# Patient Record
Sex: FEMALE | Race: WHITE | Employment: FULL TIME | URBAN - METROPOLITAN AREA
[De-identification: names, ages, dates, MRNs, and addresses within clinical notes are randomized per-mention and may not be internally consistent; named-entity substitution may affect disease eponyms.]

---

## 2020-10-09 ENCOUNTER — TELEPHONE (OUTPATIENT)
Dept: FAMILY MEDICINE CLINIC | Age: 35
End: 2020-10-09

## 2020-10-09 ENCOUNTER — OFFICE VISIT (OUTPATIENT)
Dept: FAMILY MEDICINE CLINIC | Age: 35
End: 2020-10-09
Payer: COMMERCIAL

## 2020-10-09 VITALS
BODY MASS INDEX: 32.04 KG/M2 | HEART RATE: 147 BPM | WEIGHT: 211.4 LBS | SYSTOLIC BLOOD PRESSURE: 112 MMHG | HEIGHT: 68 IN | DIASTOLIC BLOOD PRESSURE: 62 MMHG | TEMPERATURE: 97.5 F | OXYGEN SATURATION: 99 % | RESPIRATION RATE: 16 BRPM

## 2020-10-09 PROBLEM — E66.811 CLASS 1 OBESITY DUE TO EXCESS CALORIES WITHOUT SERIOUS COMORBIDITY WITH BODY MASS INDEX (BMI) OF 32.0 TO 32.9 IN ADULT: Status: ACTIVE | Noted: 2020-10-09

## 2020-10-09 PROBLEM — E66.09 CLASS 1 OBESITY DUE TO EXCESS CALORIES WITHOUT SERIOUS COMORBIDITY WITH BODY MASS INDEX (BMI) OF 32.0 TO 32.9 IN ADULT: Status: ACTIVE | Noted: 2020-10-09

## 2020-10-09 PROCEDURE — 99203 OFFICE O/P NEW LOW 30 MIN: CPT | Performed by: NURSE PRACTITIONER

## 2020-10-09 RX ORDER — CETIRIZINE HYDROCHLORIDE 10 MG/1
10 TABLET ORAL DAILY
COMMUNITY

## 2020-10-09 SDOH — HEALTH STABILITY: MENTAL HEALTH: HOW OFTEN DO YOU HAVE A DRINK CONTAINING ALCOHOL?: NEVER

## 2020-10-09 ASSESSMENT — PATIENT HEALTH QUESTIONNAIRE - PHQ9
2. FEELING DOWN, DEPRESSED OR HOPELESS: 0
SUM OF ALL RESPONSES TO PHQ QUESTIONS 1-9: 0
1. LITTLE INTEREST OR PLEASURE IN DOING THINGS: 0
SUM OF ALL RESPONSES TO PHQ QUESTIONS 1-9: 0
2. FEELING DOWN, DEPRESSED OR HOPELESS: 0
SUM OF ALL RESPONSES TO PHQ9 QUESTIONS 1 & 2: 0
SUM OF ALL RESPONSES TO PHQ9 QUESTIONS 1 & 2: 0
1. LITTLE INTEREST OR PLEASURE IN DOING THINGS: 0

## 2020-10-09 ASSESSMENT — ENCOUNTER SYMPTOMS
DIARRHEA: 0
NAUSEA: 0
CHEST TIGHTNESS: 0
APNEA: 0
TROUBLE SWALLOWING: 0
CONSTIPATION: 0
BACK PAIN: 0

## 2020-10-09 NOTE — PROGRESS NOTES
10/9/2020    Mateusz Reddy (:  1985) is a 36 y.o. female, here for evaluation of the following medical concerns:    HPI  To establish care at this practice. Relocated to [facility] related to work with the [employer]. Has family here. Body most happy at 160. Gained weight with last pregnancy and breast feeding. Baby 2 year old, no longer breast feeding. Feels related to lack of motivation. Talked to mental health provider about this. [place] to be situational.    Review of Systems   Constitutional: Negative for chills, fever and unexpected weight change. HENT: Negative for trouble swallowing. Respiratory: Negative for apnea and chest tightness. Cardiovascular: Negative for chest pain and palpitations. Gastrointestinal: Negative for constipation, diarrhea and nausea. Genitourinary: Negative for difficulty urinating. Musculoskeletal: Negative for arthralgias, back pain and gait problem. Neurological: Negative for dizziness, seizures and headaches. Psychiatric/Behavioral: Negative. Prior to Visit Medications    Medication Sig Taking? Authorizing Provider   levonorgestrel (MIRENA, 52 MG,) IUD 52 mg 1 each by Intrauterine route once Yes Historical Provider, MD   cetirizine (ZYRTEC) 10 MG tablet Take 10 mg by mouth daily Yes Historical Provider, MD        Allergies   Allergen Reactions    Codeine Nausea And Vomiting    Gluten Meal Rash     Digestive problems and eczema       History reviewed. No pertinent past medical history. History reviewed. No pertinent surgical history.     Social History     Socioeconomic History    Marital status:      Spouse name: Not on file    Number of children: Not on file    Years of education: Not on file    Highest education level: Not on file   Occupational History    Not on file   Social Needs    Financial resource strain: Not on file    Food insecurity     Worry: Not on file     Inability: Not on file  Transportation needs     Medical: Not on file     Non-medical: Not on file   Tobacco Use    Smoking status: Never Smoker    Smokeless tobacco: Never Used   Substance and Sexual Activity    Alcohol use: Never     Frequency: Never    Drug use: Never    Sexual activity: Yes     Partners: Male     Birth control/protection: I.U.D. Lifestyle    Physical activity     Days per week: Not on file     Minutes per session: Not on file    Stress: Not on file   Relationships    Social connections     Talks on phone: Not on file     Gets together: Not on file     Attends Roman Catholic service: Not on file     Active member of club or organization: Not on file     Attends meetings of clubs or organizations: Not on file     Relationship status: Not on file    Intimate partner violence     Fear of current or ex partner: Not on file     Emotionally abused: Not on file     Physically abused: Not on file     Forced sexual activity: Not on file   Other Topics Concern    Not on file   Social History Narrative    Not on file        Family History   Problem Relation Age of Onset    No Known Problems Mother     No Known Problems Father     No Known Problems Sister     No Known Problems Brother     No Known Problems Sister        Vitals:    10/09/20 1010   BP: 112/62   Site: Right Upper Arm   Position: Sitting   Cuff Size: Medium Adult   Pulse: 147   Resp: 16   Temp: 97.5 °F (36.4 °C)   TempSrc: Temporal   SpO2: 99%   Weight: 211 lb 6.4 oz (95.9 kg)   Height: 5' 8\" (1.727 m)     Estimated body mass index is 32.14 kg/m² as calculated from the following:    Height as of this encounter: 5' 8\" (1.727 m). Weight as of this encounter: 211 lb 6.4 oz (95.9 kg). Physical Exam  Constitutional:       Appearance: Normal appearance. She is well-developed. She is obese. HENT:      Head: Normocephalic and atraumatic. Neck:      Musculoskeletal: Normal range of motion and neck supple. Thyroid: No thyromegaly.       Vascular: No carotid bruit. Cardiovascular:      Rate and Rhythm: Normal rate and regular rhythm. Pulses: Normal pulses. Heart sounds: Normal heart sounds. Pulmonary:      Effort: Pulmonary effort is normal.      Breath sounds: Normal breath sounds. Abdominal:      General: Bowel sounds are normal. There is no distension. Palpations: Abdomen is soft. Musculoskeletal: Normal range of motion. Skin:     General: Skin is warm. Neurological:      Mental Status: She is alert and oriented to person, place, and time. Psychiatric:         Behavior: Behavior normal.         ASSESSMENT/PLAN:    1. Discussed limitation of calorie intake to decrease weight. Discussed weight watchers, on line apps, exercise    2. Last pap was with [facility]. Delivered at home. 6 weeks post partum mirena place and pap completed. Reported to be normal.     3. Declined flu vaccine    4. Javed Deluca was seen today for new patient. Diagnoses and all orders for this visit:    Preventative health care  -     TSH without Reflex; Future  -     T4, Free; Future  -     Lipid Panel; Future  -     Comprehensive Metabolic Panel; Future  -     CBC Auto Differential; Future    Weight gain  -     TSH without Reflex;  Future  -     T4, Free; Future                An  electronic signature was used to authenticate this note.    --MAX VENTURA - CNP on 10/9/2020 at 10:35 AM

## 2020-10-09 NOTE — TELEPHONE ENCOUNTER
Left message to patient advising her that if she can give us a form for the physical for her insurance, we can get it filled out for her. Advised patient to call back if she had any other questions or concerns.

## 2020-10-23 DIAGNOSIS — Z00.00 PREVENTATIVE HEALTH CARE: ICD-10-CM

## 2020-10-23 DIAGNOSIS — R63.5 WEIGHT GAIN: ICD-10-CM

## 2020-10-23 LAB
A/G RATIO: 2.1 (ref 1.1–2.2)
ALBUMIN SERPL-MCNC: 4.4 G/DL (ref 3.4–5)
ALP BLD-CCNC: 57 U/L (ref 40–129)
ALT SERPL-CCNC: 10 U/L (ref 10–40)
ANION GAP SERPL CALCULATED.3IONS-SCNC: 9 MMOL/L (ref 3–16)
AST SERPL-CCNC: 16 U/L (ref 15–37)
BASOPHILS ABSOLUTE: 0 K/UL (ref 0–0.2)
BASOPHILS RELATIVE PERCENT: 0.7 %
BILIRUB SERPL-MCNC: 0.5 MG/DL (ref 0–1)
BUN BLDV-MCNC: 10 MG/DL (ref 7–20)
CALCIUM SERPL-MCNC: 8.9 MG/DL (ref 8.3–10.6)
CHLORIDE BLD-SCNC: 107 MMOL/L (ref 99–110)
CHOLESTEROL, TOTAL: 156 MG/DL (ref 0–199)
CO2: 24 MMOL/L (ref 21–32)
CREAT SERPL-MCNC: <0.5 MG/DL (ref 0.6–1.1)
EOSINOPHILS ABSOLUTE: 0.2 K/UL (ref 0–0.6)
EOSINOPHILS RELATIVE PERCENT: 2.8 %
GFR AFRICAN AMERICAN: >60
GFR NON-AFRICAN AMERICAN: >60
GLOBULIN: 2.1 G/DL
GLUCOSE BLD-MCNC: 83 MG/DL (ref 70–99)
HCT VFR BLD CALC: 35.7 % (ref 36–48)
HDLC SERPL-MCNC: 50 MG/DL (ref 40–60)
HEMOGLOBIN: 11.5 G/DL (ref 12–16)
LDL CHOLESTEROL CALCULATED: 97 MG/DL
LYMPHOCYTES ABSOLUTE: 1.8 K/UL (ref 1–5.1)
LYMPHOCYTES RELATIVE PERCENT: 30.3 %
MCH RBC QN AUTO: 27.2 PG (ref 26–34)
MCHC RBC AUTO-ENTMCNC: 32.2 G/DL (ref 31–36)
MCV RBC AUTO: 84.4 FL (ref 80–100)
MONOCYTES ABSOLUTE: 0.5 K/UL (ref 0–1.3)
MONOCYTES RELATIVE PERCENT: 8.2 %
NEUTROPHILS ABSOLUTE: 3.4 K/UL (ref 1.7–7.7)
NEUTROPHILS RELATIVE PERCENT: 58 %
PDW BLD-RTO: 15.3 % (ref 12.4–15.4)
PLATELET # BLD: 258 K/UL (ref 135–450)
PMV BLD AUTO: 8.8 FL (ref 5–10.5)
POTASSIUM SERPL-SCNC: 4.3 MMOL/L (ref 3.5–5.1)
RBC # BLD: 4.23 M/UL (ref 4–5.2)
SODIUM BLD-SCNC: 140 MMOL/L (ref 136–145)
T4 FREE: 1.1 NG/DL (ref 0.9–1.8)
TOTAL PROTEIN: 6.5 G/DL (ref 6.4–8.2)
TRIGL SERPL-MCNC: 47 MG/DL (ref 0–150)
TSH SERPL DL<=0.05 MIU/L-ACNC: 1.38 UIU/ML (ref 0.27–4.2)
VLDLC SERPL CALC-MCNC: 9 MG/DL
WBC # BLD: 5.9 K/UL (ref 4–11)

## 2021-04-23 ENCOUNTER — TELEPHONE (OUTPATIENT)
Dept: FAMILY MEDICINE CLINIC | Age: 36
End: 2021-04-23

## 2021-04-23 NOTE — TELEPHONE ENCOUNTER
Received records request from pt. Called and adv her that a medical records request will incur a charge. Pt said that is fine.

## 2022-12-12 ENCOUNTER — OFFICE VISIT (OUTPATIENT)
Dept: FAMILY MEDICINE CLINIC | Age: 37
End: 2022-12-12
Payer: COMMERCIAL

## 2022-12-12 VITALS
HEART RATE: 88 BPM | BODY MASS INDEX: 29.66 KG/M2 | HEIGHT: 70 IN | DIASTOLIC BLOOD PRESSURE: 78 MMHG | SYSTOLIC BLOOD PRESSURE: 122 MMHG | WEIGHT: 207.2 LBS | OXYGEN SATURATION: 97 %

## 2022-12-12 DIAGNOSIS — F43.10 PTSD (POST-TRAUMATIC STRESS DISORDER): ICD-10-CM

## 2022-12-12 DIAGNOSIS — K90.0 CELIAC DISEASE: ICD-10-CM

## 2022-12-12 DIAGNOSIS — M67.911 ROTATOR CUFF DISORDER, RIGHT: ICD-10-CM

## 2022-12-12 DIAGNOSIS — Z76.89 ENCOUNTER TO ESTABLISH CARE: Primary | ICD-10-CM

## 2022-12-12 DIAGNOSIS — F33.42 RECURRENT MAJOR DEPRESSIVE DISORDER, IN FULL REMISSION (HCC): ICD-10-CM

## 2022-12-12 PROBLEM — E66.811 CLASS 1 OBESITY DUE TO EXCESS CALORIES WITHOUT SERIOUS COMORBIDITY WITH BODY MASS INDEX (BMI) OF 32.0 TO 32.9 IN ADULT: Status: RESOLVED | Noted: 2020-10-09 | Resolved: 2022-12-12

## 2022-12-12 PROBLEM — E66.09 CLASS 1 OBESITY DUE TO EXCESS CALORIES WITHOUT SERIOUS COMORBIDITY WITH BODY MASS INDEX (BMI) OF 32.0 TO 32.9 IN ADULT: Status: RESOLVED | Noted: 2020-10-09 | Resolved: 2022-12-12

## 2022-12-12 PROCEDURE — 99395 PREV VISIT EST AGE 18-39: CPT | Performed by: STUDENT IN AN ORGANIZED HEALTH CARE EDUCATION/TRAINING PROGRAM

## 2022-12-12 PROCEDURE — 99214 OFFICE O/P EST MOD 30 MIN: CPT | Performed by: STUDENT IN AN ORGANIZED HEALTH CARE EDUCATION/TRAINING PROGRAM

## 2022-12-12 SDOH — ECONOMIC STABILITY: FOOD INSECURITY: WITHIN THE PAST 12 MONTHS, YOU WORRIED THAT YOUR FOOD WOULD RUN OUT BEFORE YOU GOT MONEY TO BUY MORE.: NEVER TRUE

## 2022-12-12 SDOH — ECONOMIC STABILITY: HOUSING INSECURITY
IN THE LAST 12 MONTHS, WAS THERE A TIME WHEN YOU DID NOT HAVE A STEADY PLACE TO SLEEP OR SLEPT IN A SHELTER (INCLUDING NOW)?: NO

## 2022-12-12 SDOH — ECONOMIC STABILITY: FOOD INSECURITY: WITHIN THE PAST 12 MONTHS, THE FOOD YOU BOUGHT JUST DIDN'T LAST AND YOU DIDN'T HAVE MONEY TO GET MORE.: NEVER TRUE

## 2022-12-12 SDOH — ECONOMIC STABILITY: TRANSPORTATION INSECURITY
IN THE PAST 12 MONTHS, HAS THE LACK OF TRANSPORTATION KEPT YOU FROM MEDICAL APPOINTMENTS OR FROM GETTING MEDICATIONS?: NO

## 2022-12-12 SDOH — ECONOMIC STABILITY: INCOME INSECURITY: IN THE LAST 12 MONTHS, WAS THERE A TIME WHEN YOU WERE NOT ABLE TO PAY THE MORTGAGE OR RENT ON TIME?: NO

## 2022-12-12 SDOH — ECONOMIC STABILITY: HOUSING INSECURITY: IN THE LAST 12 MONTHS, HOW MANY PLACES HAVE YOU LIVED?: 1

## 2022-12-12 SDOH — ECONOMIC STABILITY: TRANSPORTATION INSECURITY
IN THE PAST 12 MONTHS, HAS LACK OF TRANSPORTATION KEPT YOU FROM MEETINGS, WORK, OR FROM GETTING THINGS NEEDED FOR DAILY LIVING?: NO

## 2022-12-12 ASSESSMENT — ENCOUNTER SYMPTOMS
SHORTNESS OF BREATH: 0
CHEST TIGHTNESS: 0
BLOOD IN STOOL: 0
ABDOMINAL DISTENTION: 0
PHOTOPHOBIA: 0
WHEEZING: 0

## 2022-12-12 ASSESSMENT — PATIENT HEALTH QUESTIONNAIRE - PHQ9
1. LITTLE INTEREST OR PLEASURE IN DOING THINGS: 1
SUM OF ALL RESPONSES TO PHQ QUESTIONS 1-9: 2
SUM OF ALL RESPONSES TO PHQ9 QUESTIONS 1 & 2: 2
SUM OF ALL RESPONSES TO PHQ QUESTIONS 1-9: 2
2. FEELING DOWN, DEPRESSED OR HOPELESS: 1

## 2022-12-12 ASSESSMENT — SOCIAL DETERMINANTS OF HEALTH (SDOH): HOW HARD IS IT FOR YOU TO PAY FOR THE VERY BASICS LIKE FOOD, HOUSING, MEDICAL CARE, AND HEATING?: NOT HARD AT ALL

## 2022-12-12 NOTE — PROGRESS NOTES
Kieran Alvarez  YOB: 1985    Date of Service:  12/12/2022    Chief Complaint:   Mariella Schrader is a 40 y.o. female who presents to establish care      Allergies: Allergies   Allergen Reactions    Codeine Nausea And Vomiting    Gluten Meal Rash     Digestive problems and eczema       Family Hx:  Family History   Problem Relation Age of Onset    No Known Problems Mother     No Known Problems Father     No Known Problems Sister     No Known Problems Brother     No Known Problems Sister     Heart Attack Paternal Grandfather        Surgical HX:  No past surgical history on file. 1 hospital birth and 2 home births  37 stitches after shoulder dystocia with hospital birth      Social Hx:  Alcohol- none, dont care for it, partner is 10 years   Tobacco- never  Recreational- never  No LMP recorded. LMP - 2 weeks ago  menstrual cycle: cycles are irregular, further apart with IUD  Sexual activity: has sex with a male, no hx STd  AbnormalSxs: no      Medications:  Current Outpatient Medications   Medication Sig Dispense Refill    levonorgestrel (MIRENA, 52 MG,) IUD 52 mg 1 each by Intrauterine route once      cetirizine (ZYRTEC) 10 MG tablet Take 10 mg by mouth daily       No current facility-administered medications for this visit.          PMHx:  Patient Active Problem List   Diagnosis    Celiac disease    PTSD (post-traumatic stress disorder)    Recurrent major depressive disorder, in full remission ([facility] [address]    Rotator cuff disorder, right           HPI:    Hx of PTSD and depression - saw a therapist, from past relationship    - overall doing well  Still have to beny to therapist and some things still trigger, but doing much better since 2-3 years ago  No Si/HI    No passive SI          Pain in right shoulder for 4-5 months  See chiro regularly  Been treating as tenoditis, adjusting weight lifting routine and babying it  Last visit with mainor, thinks it might be tear in rotator cuff  Ibuprofen PRN - once or twice a week - helps it    No Physical therapy yet, no heat or ice  Certain moves will hurt worse than others, ushing vacuum leaner or popping door handle in or handing sippy cup to kid in back seat      Wt Readings from Last 3 Encounters:   12/12/22 207 lb 3.2 oz (94 kg)   10/09/20 211 lb 6.4 oz (95.9 kg)     BP Readings from Last 3 Encounters:   12/12/22 122/78   10/09/20 112/62       Health Maintenance   Topic Date Due    HIV screen  Never done    Hepatitis C screen  Never done    COVID-19 Vaccine (4 - Booster for Moderna series) 03/28/2022    Flu vaccine (1) 12/12/2023 (Originally 8/1/2022)    Cervical cancer screen  12/02/2024 (Originally 12/5/2007)    Depression Monitoring  12/12/2023    DTaP/Tdap/Td vaccine (2 - Td or Tdap) 04/09/2030    Hepatitis A vaccine  Aged Out    Hib vaccine  Aged Out    Meningococcal (ACWY) vaccine  Aged Out    Pneumococcal 0-64 years Vaccine  Aged Out    Varicella vaccine  Discontinued       Immunization History   Administered Date(s) Administered    COVID-19, MODERNA BLUE border, Primary or Immunocompromised, (age 12y+), IM, 100 mcg/0.5mL 04/16/2021, 05/17/2021    COVID-19, MODERNA Booster BLUE border, (age 18y+), IM, 50mcg/0.25mL 01/31/2022    Tdap (Boostrix, Adacel) 04/09/2020       No flowsheet data found. PHQ-9  12/12/2022   Little interest or pleasure in doing things 1   Feeling down, depressed, or hopeless 1   PHQ-2 Score 2   PHQ-9 Total Score 2        The ASCVD Risk score (Antonieta DK, et al., 2019) failed to calculate for the following reasons: The 2019 ASCVD risk score is only valid for ages 36 to 78        Review of Systems:  Review of Systems   Constitutional:  Negative for fever and unexpected weight change. HENT:  Negative for nosebleeds. Eyes:  Negative for photophobia. Respiratory:  Negative for chest tightness, shortness of breath and wheezing. Cardiovascular:  Negative for chest pain, palpitations and leg swelling.    Gastrointestinal:  Negative for abdominal distention and blood in stool. Genitourinary:  Negative for dysuria. Musculoskeletal:  Negative for gait problem. Neurological:  Negative for seizures and syncope. Psychiatric/Behavioral:  Negative for behavioral problems. Physical Exam:   Vitals:    12/12/22 1603   BP: 122/78   Site: Right Upper Arm   Position: Sitting   Cuff Size: Large Adult   Pulse: 88   SpO2: 97%   Weight: 207 lb 3.2 oz (94 kg)   Height: 5' 10\" (1.778 m)     Body mass index is 29.73 kg/m². Physical Exam  Constitutional:       Appearance: Normal appearance. HENT:      Head: Atraumatic. Right Ear: Tympanic membrane and external ear normal.      Left Ear: Tympanic membrane and external ear normal.      Nose: Nose normal.      Mouth/Throat:      Mouth: Mucous membranes are moist.      Pharynx: Oropharynx is clear. No posterior oropharyngeal erythema. Eyes:      General: No scleral icterus. Extraocular Movements: Extraocular movements intact. Pupils: Pupils are equal, round, and reactive to light. Cardiovascular:      Rate and Rhythm: Normal rate and regular rhythm. Pulses: Normal pulses. Heart sounds: No murmur heard. Pulmonary:      Effort: Pulmonary effort is normal.      Breath sounds: Normal breath sounds. Abdominal:      General: There is no distension. Palpations: Abdomen is soft. There is no mass. Tenderness: There is no abdominal tenderness. Musculoskeletal:         General: Normal range of motion. Cervical back: Normal range of motion and neck supple. Comments: Unable to internally rotate right hand off of back, weakness of empty can sign, full FF, abduction, pain with abduction past 100 degrees of right arm, positive thomas right shoulder    Normal left shoulder exam   Skin:     General: Skin is warm and dry. Capillary Refill: Capillary refill takes 2 to 3 seconds. Neurological:      General: No focal deficit present. Mental Status: She is alert. Psychiatric:         Mood and Affect: Mood normal.         Behavior: Behavior normal.       Assessment/Plan:    Patient presents today to establish care with new provider. Annual physical today looks good, all previous blood work was normal, did get CBC with history of mild anemia, patient does donate blood regularly. We will also get BMP today as well as HIV and hepatitis C screening. Patient declined flu vaccine today. Depression screen was fine. Patient is due for her COVID booster. Patient's main concern today is right shoulder pain, has been seeing a chiropractor, consistent with rotator cuff injury. Unable to internally rotate away from her back, positive empty can sign with weakness and positive Ramirez with pain. We will send to orthopedics. Patient is a history of depression and PTSD, she currently sees a therapist, well controlled without any medications, though Zoloft and prazosin were effective in the past.  We will have her continue to follow with her therapist.      1. Encounter to establish care  -     Basic Metabolic Panel; Future  -     CBC; Future  -     HIV Screen; Future  -     Hepatitis C Antibody; Future  2. Celiac disease  3. PTSD (post-traumatic stress disorder)  4. Recurrent major depressive disorder, in full remission ([facility] [address]  5. Rotator cuff disorder, right  -     Qian Arroyo MD, Orthopedic Surgery (Shoulder; Hip; Knee), [facility]     While assessing care for this patient, I have reviewed all pertinent lab work/imaging/ specialist notes and care in reference to those problems addressed above in detail. Appropriate medical decision making was based on this. Please note that portions of this note may have been completed with a voice recognition program. Efforts were made to edit the dictations but occasionally words are mis-transcribed. Return in about 6 months (around 6/12/2023) for PTSD and shoulder.     Jessica Good MD  12/12/2022

## 2022-12-12 NOTE — PATIENT INSTRUCTIONS
We are drawing some labs today. If you have MyChart, you will see the results first, but our office will reach out to you in the next few days to over the results.

## 2022-12-13 SDOH — HEALTH STABILITY: PHYSICAL HEALTH: ON AVERAGE, HOW MANY MINUTES DO YOU ENGAGE IN EXERCISE AT THIS LEVEL?: 30 MIN

## 2022-12-13 SDOH — HEALTH STABILITY: PHYSICAL HEALTH: ON AVERAGE, HOW MANY DAYS PER WEEK DO YOU ENGAGE IN MODERATE TO STRENUOUS EXERCISE (LIKE A BRISK WALK)?: 5 DAYS

## 2022-12-14 ENCOUNTER — OFFICE VISIT (OUTPATIENT)
Dept: ORTHOPEDIC SURGERY | Age: 37
End: 2022-12-14
Payer: COMMERCIAL

## 2022-12-14 ENCOUNTER — TELEPHONE (OUTPATIENT)
Dept: ORTHOPEDIC SURGERY | Age: 37
End: 2022-12-14

## 2022-12-14 VITALS — WEIGHT: 210 LBS | HEIGHT: 70 IN | BODY MASS INDEX: 30.06 KG/M2 | RESPIRATION RATE: 16 BRPM

## 2022-12-14 DIAGNOSIS — M25.511 RIGHT SHOULDER PAIN, UNSPECIFIED CHRONICITY: Primary | ICD-10-CM

## 2022-12-14 PROCEDURE — 99203 OFFICE O/P NEW LOW 30 MIN: CPT | Performed by: ORTHOPAEDIC SURGERY

## 2022-12-14 NOTE — PROGRESS NOTES
CHIEF COMPLAINT: Right shoulder pain    History:    Tashi Kimble is a 40 y.o. right handed female referred by Faheem Wade MD for Sports Medicine consultation for evaluation and treatment of Right shoulder pain. This is evaluated as a personal injury. The pain began 6 months ago. Pain is rated as a 6/10. There was not an injury. Pain is located laterally, posteriorly and along her trapezius. And feels are worse with laying on her side, reaching behind her back, and repetitive forward and backward motion while using her vacuum . She sees a chiropractor. The patient has not had PT. The patient has not had an injection. The patient has tried NSAIDs with relief. The patient has not tried ice. Patient's occupation is       Past Medical History:   Diagnosis Date    Anxiety 2020    Treated and released, neuropsych center [facility] 2020    Treated and released, neuropsych [facility]       No past surgical history on file. Current Outpatient Medications on File Prior to Visit   Medication Sig Dispense Refill    levonorgestrel (MIRENA, 52 MG,) IUD 52 mg 1 each by Intrauterine route once      cetirizine (ZYRTEC) 10 MG tablet Take 10 mg by mouth daily       No current facility-administered medications on file prior to visit.        Allergies   Allergen Reactions    Codeine Nausea And Vomiting    Gluten Meal Rash     Digestive problems and eczema       Social History     Socioeconomic History    Marital status:      Spouse name: Not on file    Number of children: Not on file    Years of education: Not on file    Highest education level: Not on file   Occupational History    Not on file   Tobacco Use    Smoking status: Never    Smokeless tobacco: Never   Vaping Use    Vaping Use: Never used   Substance and Sexual Activity    Alcohol use: Never    Drug use: Never    Sexual activity: Yes     Partners: Male     Birth control/protection: I.U.D. Other Topics Concern    Not on file   Social History Narrative    Not on file     Social Determinants of Health     Financial Resource Strain: Low Risk     Difficulty of Paying Living Expenses: Not hard at all   Food Insecurity: No Food Insecurity    Worried About [facility] in the Last Year: Never true    [facility] in the Last Year: Never true   Transportation Needs: No Transportation Needs    Lack of Transportation (Medical): No    Lack of Transportation (Non-Medical): No   Physical Activity: Sufficiently Active    Days of Exercise per Week: 5 days    Minutes of Exercise per Session: 30 min   Stress: Not on file   Social Connections: Not on file   Intimate Partner Violence: Not At Risk    Fear of Current or Ex-Partner: No    Emotionally Abused: No    Physically Abused: No    Sexually Abused: No   Housing Stability: Low Risk     Unable to Pay for Housing in the Last Year: No    Number of Places Lived in the Last Year: 1    Unstable Housing in the Last Year: No       Family History   Problem Relation Age of Onset    No Known Problems Mother     No Known Problems Father     No Known Problems Sister     No Known Problems Brother     No Known Problems Sister     Heart Attack Paternal Grandfather            Physical Examination:      Vital signs:  Resp 16   Ht 5' 10\" (1.778 m)   Wt 210 lb (95.3 kg)   LMP 11/28/2022   BMI 30.13 kg/m²     General:   alert, appears stated age, cooperative, and no distress   Right Shoulder   Active ROM:   forward flexion 180, external rotation 60, internal rotation T10. Left shoulder: forward flexion 180, external rotation 80, internal rotation T10.       Joint Tenderness:   lateral acromial   Neer:   negative   Ramirez:   negative   Strength:   5/5 Supraspinatus, 5-/5 External rotation    Left shoulder: 5/5 Supraspinatus, External rotation    Drop-arm test:   negative   Belly-press test:   negative   Bear-hug test:   negative   Speed's test:   Unable to test secondary to apin   Bicipital groove tenderness:  positive   Patterson's test:   not tested   Cross-body adduction test:   Pain laterally    AC joint tenderness:   negative     There are no skin lesions, cellulitis, or extreme edema in the upper extremities. Sensation is grossly intact to light touch bilaterally upper extremity. The patient has warm and well-perfused bilateral upper extremities with brisk capillary refill. Imaging   Right Shoulder X-Ray: 3 views obtained and reviewed  AC Joint: narrowing moderate  Glenohumeral joint: no abnormalities noted  Elevation humeral head: absent    Right Shoulder MRI: ordered, but not yet obtained      Assessment:      Right shoulder pain, rotator cuff tinnitus versus tear      Plan:      Natural history and expected course discussed. Questions answered. Discussed with patient I suspect her symptoms are coming from her rotator cuff. We discussed that most patients her age would not have a rotator cuff tear unless they have significant trauma. However, on exam she does have rotator cuff weakness on external rotation testing and no obvious pain during testing her strength. Discussed immediately suspicious for a rotator cuff tear. MRI of right shoulder to evaluate rotator cuff or tear. Continue NSAIDs as needed. Reccommend ice the shoulder as needed. Follow-up after MRI            Baron Sevilla. Juan Devries MD  Orthopaedic Surgery and Sports Medicine     Disclaimer: This note was generated with use of a verbal recognition program and an attempt was made to check for errors. It is possible that there are still dictated errors within this office note. If so, please bring any significant errors to my attention for an addendum. All efforts were made to ensure that this office note is accurate.

## 2022-12-14 NOTE — TELEPHONE ENCOUNTER
MRI order, authorization and demographics faxed to [facility]. Patient notified to call [facility] if she has not heard from them by this time tomorrow to schedule.  Follow up in office for results and treatment options

## 2023-01-11 ENCOUNTER — OFFICE VISIT (OUTPATIENT)
Dept: ORTHOPEDIC SURGERY | Age: 38
End: 2023-01-11
Payer: COMMERCIAL

## 2023-01-11 VITALS — RESPIRATION RATE: 16 BRPM | WEIGHT: 212 LBS | BODY MASS INDEX: 30.35 KG/M2 | HEIGHT: 70 IN

## 2023-01-11 DIAGNOSIS — M75.111 INCOMPLETE TEAR OF RIGHT ROTATOR CUFF, UNSPECIFIED WHETHER TRAUMATIC: Primary | ICD-10-CM

## 2023-01-11 PROCEDURE — 99213 OFFICE O/P EST LOW 20 MIN: CPT | Performed by: ORTHOPAEDIC SURGERY

## 2023-01-11 NOTE — PROGRESS NOTES
CHIEF COMPLAINT: Right shoulder pain    History:    Gustavo Young is a 40 y.o. right handed female here for right shoulder follow-up. Initial history: referred by Erik Fleming MD for Sports Medicine consultation for evaluation and treatment of Right shoulder pain. This is evaluated as a personal injury. The pain began 6 months ago. Pain is rated as a 6/10. There was not an injury. Pain is located laterally, posteriorly and along her trapezius. And feels are worse with laying on her side, reaching behind her back, and repetitive forward and backward motion while using her vacuum . She sees a chiropractor. The patient has not had PT. The patient has not had an injection. The patient has tried NSAIDs with relief. The patient has not tried ice. Patient's occupation is     Interval History: She states pain now is located mostly anteriorly. She rates pain 4/10. Past Medical History:   Diagnosis Date    Anxiety 2020    Treated and released, neuropsych [facility]    Depression 2020    Treated and released, neuropsych [facility]       History reviewed. No pertinent surgical history. Current Outpatient Medications on File Prior to Visit   Medication Sig Dispense Refill    Ferrous Sulfate (IRON PO) Take by mouth      Ascorbic Acid (VITAMIN C PO) Take by mouth      levonorgestrel (MIRENA, 52 MG,) IUD 52 mg 1 each by Intrauterine route once      cetirizine (ZYRTEC) 10 MG tablet Take 10 mg by mouth daily       No current facility-administered medications on file prior to visit.        Allergies   Allergen Reactions    Codeine Nausea And Vomiting    Gluten Meal Rash     Digestive problems and eczema       Social History     Socioeconomic History    Marital status:      Spouse name: Not on file    Number of children: Not on file    Years of education: Not on file    Highest education level: Not on file   Occupational History Occupation:    Tobacco Use    Smoking status: Never    Smokeless tobacco: Never   Vaping Use    Vaping Use: Never used   Substance and Sexual Activity    Alcohol use: Never    Drug use: Never    Sexual activity: Yes     Partners: Male     Birth control/protection: I.U.D. Other Topics Concern    Not on file   Social History Narrative    Not on file     Social Determinants of Health     Financial Resource Strain: Low Risk     Difficulty of Paying Living Expenses: Not hard at all   Food Insecurity: No Food Insecurity    Worried About [address] in the Last Year: Never true    [address] [facility] in the Last Year: Never true   Transportation Needs: No Transportation Needs    Lack of Transportation (Medical): No    Lack of Transportation (Non-Medical): No   Physical Activity: Sufficiently Active    Days of Exercise per Week: 5 days    Minutes of Exercise per Session: 30 min   Stress: Not on file   Social Connections: Not on file   Intimate Partner Violence: Not At Risk    Fear of Current or Ex-Partner: No    Emotionally Abused: No    Physically Abused: No    Sexually Abused: No   Housing Stability: Low Risk     Unable to Pay for Housing in the Last Year: No    Number of Places Lived in the Last Year: 1    Unstable Housing in the Last Year: No       Family History   Problem Relation Age of Onset    No Known Problems Mother     No Known Problems Father     No Known Problems Sister     No Known Problems Brother     No Known Problems Sister     Heart Attack Paternal Grandfather            Physical Examination:      Vital signs:  Resp 16   Ht 5' 10\" (1.778 m)   Wt 212 lb (96.2 kg)   BMI 30.42 kg/m²     General:   alert, appears stated age, cooperative, and no distress   Right Shoulder   Active ROM:   forward flexion 180, external rotation 60, internal rotation T10. Left shoulder: forward flexion 180, external rotation 80, internal rotation T10.       Joint Tenderness:   lateral acromial   Neer:   negative   Ramirez:   negative   Strength:   5/5 Supraspinatus, 5-/5 External rotation    Left shoulder: 5/5 Supraspinatus, External rotation    Drop-arm test:   negative   Belly-press test:   negative   Bear-hug test:   negative   Speed's test:  Positive   Bicipital groove tenderness:  positive   Patterson's test:  Positive   Cross-body adduction test:   Pain laterally    AC joint tenderness:   negative        Imaging   Right Shoulder X-Ray:   AC Joint: narrowing moderate  Glenohumeral joint: no abnormalities noted  Elevation humeral head: absent    Right Shoulder MRI:   1. Biceps pulley mechanism injury, with mild subscapularis tendinopathy and humeral sided    delamination of the superior insertional fibers. Adjacent mild tendinopathy of the long head    biceps tendon, with tendon partially subluxed at the bicipital groove. 2. Nondisplaced SLAP type 2B tear of the superior glenoid labrum. 3. Mild AC joint arthrosis, with penetrating erosions and localized osteoedema of the distal    clavicle. No acute AC joint injury or separation. 4. The remainder of the rotator cuff complex is intact. Assessment:      Right shoulder partial rotator cuff open (subscapularis) tear  Right shoulder long head biceps tendon subluxation  Right shoulder SLAP tear        Plan:      Natural history and expected course discussed. Questions answered. We reviewed the MRI images and discussed the findings. Discussed that I would normally recommend initial nonoperative treatment for SLAP tear. Some of her symptoms are consistent with that. Her biceps symptoms may also be secondary to her SLAP tear. However, we also discussed that she does have an articular sided partial tear of her subscapularis which likely result in the biceps tendon subluxation. There is also possibility that she may have a slight full thickness upper border tear resulting in the biceps tendon subluxation.   We discussed nonoperative and operative treatment options. She wants to start with physical therapy and think about either continuing nonoperatively versus surgical intervention. PT referral provided. Ice and NSAIDs as needed. Follow-up in 6 weeks. Nannette Allen. Lucien Herman MD  Orthopaedic Surgery and Sports Medicine     Disclaimer: This note was generated with use of a verbal recognition program and an attempt was made to check for errors. It is possible that there are still dictated errors within this office note. If so, please bring any significant errors to my attention for an addendum. All efforts were made to ensure that this office note is accurate.

## 2023-01-17 ENCOUNTER — HOSPITAL ENCOUNTER (OUTPATIENT)
Dept: PHYSICAL THERAPY | Age: 38
Setting detail: THERAPIES SERIES
Discharge: HOME | End: 2023-01-17
Payer: COMMERCIAL

## 2023-01-17 PROCEDURE — 97161 PT EVAL LOW COMPLEX 20 MIN: CPT

## 2023-01-17 PROCEDURE — 97112 NEUROMUSCULAR REEDUCATION: CPT

## 2023-01-17 PROCEDURE — 97110 THERAPEUTIC EXERCISES: CPT

## 2023-01-17 NOTE — PROGRESS NOTES
[address] [facility], [address] [place] [address], [address] [place] [address] [address]  Phone: [phone]   Fax: [phone]    Date: 2023          Patient Name; :  Magy Thompson; 1985   Dx: M75.111 (ICD-10-CM) - Incomplete tear of right rotator cuff, unspecified whether traumatic      Physician:  Massiel Barba MD        Total PT Visits:      Measures Previous Current   Pain (0-10)                Assessment:        Prognosis for POC: [] Good [] Fair  [] Poor      Patient requires continued skilled intervention: [] Yes  [] No        Plan & Recommendations:  [] Continue rehabilitation due to objective improvement and continued functional deficits with frequency and duration:   [] Progress toward  []GAP, []Work Conditioning, []Independent HEP   [] Discharge due to   [] All goals achieved, [] Maximized \"medical necessity\" [] No subjective or objective improvements      Electronically signed by:  Aura Galan, PT  Therapy Plan of Care Re-Certification  This patient has been re-evaluated for physical therapy services and for therapy to continue, Medicare, Medicaid and other insurances require periodic physician review of the treatment plan. Please review the above re-evaluation and verify that you agree with plan of care as established above by signing the attached document and return it to our office or note changes to established plan below  [] Follow treatment plan as above [] Discontinue physical therapy  [] Change plan to:                                 __________________________________________________    Physician Signature:____________________________________ Date:____________  By signing above, therapists plan is approved by physician    If you have any questions or concerns, please don't hesitate to call.   Thank you for your referral.

## 2023-01-17 NOTE — FLOWSHEET NOTE
[facility], [facility]  [address] [place] [address]  Phone: [phone]   Fax:     [phone]      Physical Therapy Treatment Note/ Progress Report:           Date:  2023    Patient Name:  Fatou Alvarez    :  1985  MRN: [id number]  Restrictions/Precautions:    Medical/Treatment Diagnosis Information:  Diagnosis: M75.111 (ICD-10-CM) - Incomplete tear of right rotator cuff, unspecified whether traumatic  Treatment Diagnosis: Right shoulder pain, decreased strength  Insurance/Certification information:  [facility]  Physician Information:   Tomas Patino MD  Has the plan of care been signed (Y/N):        []  Yes  [x]  No     Date of Patient follow up with Physician:     Assessment Summary: Fatou is a 37 y.o. female reporting to OP PT with c/c of right shoulder pain which has been occurring for about 6 months without known MARISA. Pt is noted to have mild reduction in internal ROm and significant reduction in abduction and internal rotation strength. She does have a positive lift of test.       Date Range for reporting period:  Beginnin23  Endin23      Recertification will be due (POC Duration  / 90 days whichever is less): 3/17/23          Visit # Insurance Allowable Auth Required   In Person  No infor []  Yes     []  No    Tele Health   []  Yes     []  No    Total 1         Functional Scale: Quick Dash 14%   Date assessed:     Latex Allergy:  [x]NO      []YES  Preferred Language for Healthcare:   [x]English       []other:      Pain level:  0-4/10         SUBJECTIVE:  See eval        OBJECTIVE: See eval          RESTRICTIONS/PRECAUTIONS: None    Exercises/Interventions: HEP code: [id number]  Therapeutic Ex (74049) HEP 23     Warm-up       Pulleys       UBE              TABLE       Supine concentric circles x X30 ea     Supine serratus punch x x30     SL Concentric circles x X30 ea                                         SEATED       UT stretch x 30\"x2     Levator stretch x 30\"x2                          STANDING       IR isometric x 10\"x5     ER Isometric x 10\"x5     Abduction isometric x 10\"x5     Rows x X30, BTB     Extension x X20, BTB                     Manual: PROM into shoulder flexion, abduction, ER and IR in neutral, 45° and 60° abduction 3'      Therapeutic Exercise and NMR EXR  [x] (02326) Provided verbal/tactile cueing for activities related to strengthening, flexibility, endurance, ROM  for improvements in scapular, scapulothoracic and UE control with self care, reaching, carrying, lifting, house/yardwork, driving/computer work.    [] (02362) Provided verbal/tactile cueing for activities related to improving balance, coordination, kinesthetic sense, posture, motor skill, proprioception  to assist with  scapular, scapulothoracic and UE control with self care, reaching, carrying, lifting, house/yardwork, driving/computer work.    Therapeutic Activities:    [x] (83699 or 16144) Provided verbal/tactile cueing for activities related to improving balance, coordination, kinesthetic sense, posture, motor skill, proprioception and motor activation to allow for proper function of scapular, scapulothoracic and UE control with self care, carrying, lifting, driving/computer work.     Home Exercise Program:    [x] (07971) Reviewed/Progressed HEP activities related to strengthening, flexibility, endurance, ROM of scapular, scapulothoracic and UE control with self care, reaching, carrying, lifting, house/yardwork, driving/computer work  [] (87849) Reviewed/Progressed HEP activities related to improving balance, coordination, kinesthetic sense, posture, motor skill, proprioception of scapular, scapulothoracic and UE control with self care, reaching, carrying, lifting, house/yardwork, driving/computer work      Manual Treatments:  PROM / STM / Oscillations-Mobs:  G-I, II, III, IV (PA's, Inf., Post.)  [x] (29575) Provided  manual therapy to mobilize soft tissue/joints of cervical/CT, scapular GHJ and UE for the purpose of modulating pain, promoting relaxation,  increasing ROM, reducing/eliminating soft tissue swelling/inflammation/restriction, improving soft tissue extensibility and allowing for proper ROM for normal function with self care, reaching, carrying, lifting, house/yardwork, driving/computer work    Modalities:     [] GAME READY (VASO)- for significant edema, swelling, pain control. Charges:  Timed Code Treatment Minutes: 25   Total Treatment Minutes:  40   BWC:  TE TIME:  NMR TIME:  MANUAL TIME:  TA  UNTIMED MINUTES:  Medicare Total:   15  10      15        [x] EVAL (LOW) 92165 (typically 20 minutes face-to-face)  [] EVAL (MOD) 74256 (typically 30 minutes face-to-face)  [] EVAL (HIGH) 30930 (typically 45 minutes face-to-face)  [] RE-EVAL     [x] CQ(07042) 1     [] IONTO  [x] NMR (72888) 1     [] VASO  [] Manual (22640) x     [] Other:  [] TA x      [] Mech Traction (27102)  [] ES(attended) (60547)      [] ES (un) (48970):           GOALS:     Patient stated goal: Reduce pain, return to workouts. Therapist goals for Patient:   Short Term Goals: To be achieved in: 2 weeks  1. Independent in HEP and progression per patient tolerance, in order to prevent re-injury. [] Progressing: [] Met: [] Not Met: [] Adjusted      2. Patient will have a decrease in pain to facilitate improvement in movement, function, and ADLs as indicated by Functional Deficits. [] Progressing: [] Met: [] Not Met: [] Adjusted      Long Term Goals: To be achieved in: 8 weeks  1. Pt will improve quick dash to 0, indicating improved functional capacity . [] Progressing: [] Met: [] Not Met: [] Adjusted      2. Patient will demonstrate increased AROM IR to T7 to allow for proper joint functioning as indicated by patients Functional Deficits. [] Progressing: [] Met: [] Not Met: [] Adjusted      3.  Patient will demonstrate an increase in Strength of abduction/IR to 5/5 to allow for proper functional mobility as indicated by patients Functional Deficits. [] Progressing: [] Met: [] Not Met: [] Adjusted      4. Patient will return to functional activities without increased symptoms or restriction. [] Progressing: [] Met: [] Not Met: [] Adjusted      5. Pt will return to gym workouts and swimming. [] Progressing: [] Met: [] Not Met: [] Adjusted               ASSESSMENT:  See eval    Patient received education on their current pathology and how their condition effects them with their functional activities. Patient understood discussion and questions were answered. Patient understands their activity limitations and understands rational for treatment progression. Pt educated on plan of care and HEP, if worsening symptoms to d/c that exercise. PLAN: See victor mal  [x] Continue per plan of care [] Alter current plan (see comments above)  [] Plan of care initiated [] Hold pending MD visit [] Discharge      Electronically signed by:  Fauzia Pierson, PT    Note: If patient does not return for scheduled/ recommended follow up visits, this note will serve as a discharge from care along with most recent update on progress.

## 2023-01-17 NOTE — PLAN OF CARE
[facility], [address] [facility] [address], [address] [facility] [address]  Phone: [phone]   Fax:     [phone]                                                       Physical Therapy Certification    Dear  Frankey Gourd, MD,    We had the pleasure of evaluating the following patient for physical therapy services at [address] [place] [address]. A summary of our findings can be found in the initial assessment below. This includes our plan of care. If you have any questions or concerns regarding these findings, please do not hesitate to contact me at the office phone number checked above. Thank you for the referral.       Physician Signature:_______________________________Date:__________________  By signing above (or electronic signature), therapists plan is approved by physician      Patient: Gurpreet Nuno   : 1985   MRN: [id number]  Referring Physician:  Frankey Gourd, MD      Evaluation Date: 2023      Medical Diagnosis Information:  Diagnosis: M75.111 (ICD-10-CM) - Incomplete tear of right rotator cuff, unspecified whether traumatic   Treatment Diagnosis: Right shoulder pain, decreased strength                                         Insurance information:  BCBS    Precautions/ Contra-indications: None    Latex Allergy:  [x]NO      []YES    Preferred Language for Healthcare:   [x]English       []other:    SUBJECTIVE: Patient states began having pain about 6 months ago. Had progressive pain with weight training. Relevant Medical History: None    Pain Scale: Current: 0/10; Max: 4/10; Best: 0/10    Easing factors: Rest    Provocative factors: Movement, lifting     Type: []Constant   [x]Intermittent  []Radiating []Localized []other:     Numbness/Tingling: Periodic numbness in 5th digit but rare. Occupation/School: .      Living Status/Prior Level of Function: Independent with ADLs and IADLs. C-SSRS Triggered by Intake questionnaire (Past 2 wk assessment):   [x] No, Questionnaire did not trigger screening.   [] Yes, Patient intake triggered further evaluation      [] C-SSRS Screening completed  [] PCP notified via Plan of Care  [] Emergency services notified     OBJECTIVE:     CERVICAL ROM RIGHT LEFT   Cervical Flexion     Cervical Extension     Cervical Lateral Flexion     Cervical Rotation          ROM RIGHT LEFT   Shoulder Flexion 170    Shoulder Abduction 170    Shoulder External Rotation 75    Shoulder Internal Rotation L1                   Strength  RIGHT LEFT   Shoulder Flexion 4+    Shoulder Abduction 4-    Shoulder External Rotation 5    Shoulder Internal Rotation 4-         Elbow flexion     Wrist Extension     Elbow extension     5th digi abduction            Reflexes/Sensation:    [x]Dermatomes/Myotomes intact    []Reflexes equal and normal bilaterally    []Other:    Joint mobility:    [x]Normal    []Hypo   []Hyper    Palpation: ttp subacromial space. Functional Mobility/Transfers: Independent    Posture: WNL    Bandages/Dressings/Incisions: None    Gait: (include devices/WB status): WNL    Orthopedic Special Tests: - speeds, + lift off                       [x] Patient history, allergies, meds reviewed. Medical chart reviewed. See intake form. Review Of Systems (ROS):  [x]Performed Review of systems (Integumentary, CardioPulmonary, Neurological) by intake and observation. Intake form has been scanned into medical record. Patient has been instructed to contact their primary care physician regarding ROS issues if not already being addressed at this time.       Co-morbidities/Complexities (which will affect course of rehabilitation):   []None           Arthritic conditions   []Rheumatoid arthritis (M05.9)  []Osteoarthritis (M19.91)   Cardiovascular conditions   []Hypertension (I10)  []Hyperlipidemia (E78.5)  []Angina pectoris (I20)  []Atherosclerosis (I70) Musculoskeletal conditions   []Disc pathology   []Congenital spine pathologies   []Prior surgical intervention  []Osteoporosis (M81.8)  []Osteopenia (M85.8)   Endocrine conditions   []Hypothyroid (E03.9)  []Hyperthyroid Gastrointestinal conditions   []Constipation (Y24.22)   Metabolic conditions   []Morbid obesity (E66.01)  []Diabetes type 1(E10.65) or 2 (E11.65)   []Neuropathy (G60.9)     Pulmonary conditions   []Asthma (J45)  []Coughing   []COPD (J44.9)   Psychological Disorders  [x]Anxiety (F41.9)  [x]Depression (F32.9)   []Other:   []Other:          Barriers to/and or personal factors that will affect rehab potential:              []Age  []Sex              []Motivation/Lack of Motivation                        []Co-Morbidities              []Cognitive Function, education/learning barriers              []Environmental, home barriers              []profession/work barriers  []past PT/medical experience  []other:  Justification:      Falls Risk Assessment (30 days):   [x] Falls Risk assessed and no intervention required. [] Falls Risk assessed and Patient requires intervention due to being higher risk   TUG score (>12s at risk):     [] Falls education provided, including         Functional Questionnaire: Quick Dash 14%      ASSESSMENT: Megan Littlejohn is a 40 y.o. female reporting to OP PT with c/c of right shoulder pain which has been occurring for about 6 months without known MARISA. Pt is noted to have mild reduction in internal ROm and significant reduction in abduction and internal rotation strength.  She does have a positive lift of test.       Functional Impairments   []Noted spinal or UE joint hypomobility   []Noted spinal or UE joint hypermobility   [x]Decreased UE functional ROM   [x]Decreased UE functional strength   []Abnormal reflexes/sensation/myotomal/dermatomal deficits   [x]Decreased RC/scapular/core strength and neuromuscular control   []other:      Functional Activity Limitations (from functional questionnaire and intake)   []Reduced ability to tolerate prolonged functional positions   []Reduced ability or difficulty with changes of positions or transfers between positions   []Reduced ability to maintain good posture and demonstrate good body mechanics with sitting, bending, and lifting   [] Reduced ability or tolerance with driving and/or computer work   []Reduced ability to sleep   [x]Reduced ability to perform lifting, reaching, carrying tasks   [x]Reduced ability to tolerate impact through UE   [x]Reduced ability to reach behind back   []Reduced ability to  or hold objects   []Reduced ability to throw or toss an object   []other:    Participation Restrictions   []Reduced participation in self care activities   []Reduced participation in home management activities   []Reduced participation in work activities   [x]Reduced participation in social activities. [x]Reduced participation in sport/recreation activities. Classification:   []Signs/symptoms consistent with post-surgical status including decreased ROM, strength and function.   []Signs/symptoms consistent with joint sprain/strain   []Signs/symptoms consistent with shoulder impingement   [x]Signs/symptoms consistent with shoulder tendinopathy   [x]Signs/symptoms consistent with Rotator cuff tear   []Signs/symptoms consistent with labral tear   []Signs/symptoms consistent with postural dysfunction    []Signs/symptoms consistent with Glenohumeral IR Deficit - <45 degrees   []Signs/symptoms consistent with facet dysfunction of cervical/thoracic spine    []Signs/symptoms consistent with pathology which may benefit from Dry needling     []other:     Prognosis/Rehab Potential:      []Excellent   [x]Good    []Fair   []Poor    Tolerance of evaluation/treatment:    []Excellent   [x]Good    []Fair   []Poor    Physical Therapy Evaluation Complexity Justification  [x] A history of present problem with:  [] no personal factors and/or comorbidities that impact the plan of care;  [x]1-2 personal factors and/or comorbidities that impact the plan of care  []3 personal factors and/or comorbidities that impact the plan of care  [x] An examination of body systems using standardized tests and measures addressing any of the following: body structures and functions (impairments), activity limitations, and/or participation restrictions;:  [x] a total of 1-2 or more elements   [] a total of 3 or more elements   [] a total of 4 or more elements   [x] A clinical presentation with:  [x] stable and/or uncomplicated characteristics   [] evolving clinical presentation with changing characteristics  [] unstable and unpredictable characteristics;   [x] Clinical decision making of [] low, [] moderate, [] high complexity using standardized patient assessment instrument and/or measurable assessment of functional outcome. [x] EVAL (LOW) 73951 (typically 20 minutes face-to-face)  [] EVAL (MOD) 85254 (typically 30 minutes face-to-face)  [] EVAL (HIGH) 15731 (typically 45 minutes face-to-face)  [] RE-EVAL     PLAN:  Frequency/Duration:  2 days per week for 8 Weeks:  INTERVENTIONS:  [x] Therapeutic exercise including: strength training, ROM, for Upper extremity and core   [x]  NMR activation and proprioception for UE, scap and Core   [x] Manual therapy as indicated for shoulder, scapula and spine to include: Dry Needling/IASTM, STM, PROM, Gr I-IV mobilizations, manipulation. [x] Modalities as needed that may include: thermal agents, E-stim, Biofeedback, US, iontophoresis as indicated  [x] Patient education on joint protection, postural re-education, activity modification, progression of HEP. HEP instruction: [id number]    GOALS:  Patient stated goal: Reduce pain, return to workouts. Therapist goals for Patient:   Short Term Goals: To be achieved in: 2 weeks  1. Independent in HEP and progression per patient tolerance, in order to prevent re-injury.    [] Progressing: [] Met: [] Not Met: [] Adjusted     2. Patient will have a decrease in pain to facilitate improvement in movement, function, and ADLs as indicated by Functional Deficits. [] Progressing: [] Met: [] Not Met: [] Adjusted     Long Term Goals: To be achieved in: 8 weeks  1. Pt will improve quick dash to 0, indicating improved functional capacity . [] Progressing: [] Met: [] Not Met: [] Adjusted     2. Patient will demonstrate increased AROM IR to T7 to allow for proper joint functioning as indicated by patients Functional Deficits. [] Progressing: [] Met: [] Not Met: [] Adjusted     3. Patient will demonstrate an increase in Strength of abduction/IR to 5/5 to allow for proper functional mobility as indicated by patients Functional Deficits. [] Progressing: [] Met: [] Not Met: [] Adjusted     4. Patient will return to functional activities without increased symptoms or restriction. [] Progressing: [] Met: [] Not Met: [] Adjusted     5. Pt will return to gym workouts and swimming.      [] Progressing: [] Met: [] Not Met: [] Adjusted      Electronically signed by:  Dragan Goode, PT

## 2023-01-24 ENCOUNTER — HOSPITAL ENCOUNTER (OUTPATIENT)
Dept: PHYSICAL THERAPY | Age: 38
Setting detail: THERAPIES SERIES
Discharge: HOME | End: 2023-01-24
Payer: COMMERCIAL

## 2023-01-24 PROCEDURE — 97110 THERAPEUTIC EXERCISES: CPT

## 2023-01-24 PROCEDURE — 97112 NEUROMUSCULAR REEDUCATION: CPT

## 2023-01-24 NOTE — FLOWSHEET NOTE
[address] [place] [address] and [facility], [address] [place] [address], [address] [place] [address] [address]  Phone: [phone]   Fax:     [phone]      Physical Therapy Treatment Note/ Progress Report:           Date:  2023    Patient Name:  Estrella Mckinney    :  1985  MRN: [id number]  Restrictions/Precautions:    Medical/Treatment Diagnosis Information:  Diagnosis: M75.111 (ICD-10-CM) - Incomplete tear of right rotator cuff, unspecified whether traumatic  Treatment Diagnosis: Right shoulder pain, decreased strength  Insurance/Certification information:  [facility]  Physician Information:   Azalia Marks MD  Has the plan of care been signed (Y/N):        []  Yes  [x]  No     Date of Patient follow up with Physician:     Assessment Summary: Alfredito is a 40 y.o. female reporting to OP PT with c/c of right shoulder pain which has been occurring for about 6 months without known MARISA. Pt is noted to have mild reduction in internal ROm and significant reduction in abduction and internal rotation strength. She does have a positive lift of test.       Date Range for reporting period:  Beginnin23  Endin      Recertification will be due (POC Duration  / 90 days whichever is less): 3/17/23          Visit # Insurance Allowable Auth Required   In Person  No infor []  Yes     []  No    Tele Health   []  Yes     []  No    Total 2         Functional Scale: Quick Dash 14%   Date assessed:     Latex Allergy:  [x]NO      []YES  Preferred Language for Healthcare:   [x]English       []other:      Pain level:  2/10         SUBJECTIVE:  Pt states had some muscular soreness few days after last session.          OBJECTIVE:             RESTRICTIONS/PRECAUTIONS: None    Exercises/Interventions: HEP code: [id number]  Therapeutic Ex (59247) HEP 23    Warm-up       Pulleys       UBE   2'/2', Lv 4           TABLE       Supine concentric circles x X30 ea X30 ea, 2#    ABCs X1, 2#    Supine serratus punch x x30 X30, 2#    SL Concentric circles x X30 ea     SL Abduction                                   SEATED       UT stretch x 30\"x2     Levator stretch x 30\"x2                          STANDING       IR isometric x 10\"x5 10\"x5    ER Isometric x 10\"x5 10\"x5    Abduction isometric x 10\"x5 10\"x5    Abduction Eccentric   X10, 2#    Rows x X30, BTB     Extension x X20, BTB     Wall weight shift   x20    Extension walk away   x10    IR walk away   x10    Bicep curl   X30, 3#    IR behind back   X15 ea, 2#      Manual: PROM into shoulder flexion, abduction, ER and IR in neutral, 45° and 60° abduction 3'      Therapeutic Exercise and NMR EXR  [x] (10891) Provided verbal/tactile cueing for activities related to strengthening, flexibility, endurance, ROM  for improvements in scapular, scapulothoracic and UE control with self care, reaching, carrying, lifting, house/yardwork, driving/computer work.    [] (76116) Provided verbal/tactile cueing for activities related to improving balance, coordination, kinesthetic sense, posture, motor skill, proprioception  to assist with  scapular, scapulothoracic and UE control with self care, reaching, carrying, lifting, house/yardwork, driving/computer work. Therapeutic Activities:    [x] (74930 or 68158) Provided verbal/tactile cueing for activities related to improving balance, coordination, kinesthetic sense, posture, motor skill, proprioception and motor activation to allow for proper function of scapular, scapulothoracic and UE control with self care, carrying, lifting, driving/computer work.      Home Exercise Program:    [x] (07227) Reviewed/Progressed HEP activities related to strengthening, flexibility, endurance, ROM of scapular, scapulothoracic and UE control with self care, reaching, carrying, lifting, house/yardwork, driving/computer work  [] (66050) Reviewed/Progressed HEP activities related to improving balance, coordination, kinesthetic sense, posture, motor skill, proprioception of scapular, scapulothoracic and UE control with self care, reaching, carrying, lifting, house/yardwork, driving/computer work      Manual Treatments:  PROM / STM / Oscillations-Mobs:  G-I, II, III, IV (PA's, Inf., Post.)  [x] (61923) Provided manual therapy to mobilize soft tissue/joints of cervical/CT, scapular GHJ and UE for the purpose of modulating pain, promoting relaxation,  increasing ROM, reducing/eliminating soft tissue swelling/inflammation/restriction, improving soft tissue extensibility and allowing for proper ROM for normal function with self care, reaching, carrying, lifting, house/yardwork, driving/computer work    Modalities:     [] GAME READY (VASO)- for significant edema, swelling, pain control. Charges:  Timed Code Treatment Minutes: 42   Total Treatment Minutes:  42   BWC:  TE TIME:  NMR TIME:  MANUAL TIME:  TA  UNTIMED MINUTES:  Medicare Total:   25  12              [] EVAL (LOW) 34420 (typically 20 minutes face-to-face)  [] EVAL (MOD) 01723 (typically 30 minutes face-to-face)  [] EVAL (HIGH) 39632 (typically 45 minutes face-to-face)  [] RE-EVAL     [x] UV(99563) 2     [] IONTO  [x] NMR (70295) 1     [] VASO  [] Manual (70261) x     [] Other:  [] TA x      [] Mech Traction (31883)  [] ES(attended) (75161)      [] ES (un) (72157):           GOALS:     Patient stated goal: Reduce pain, return to workouts. Therapist goals for Patient:   Short Term Goals: To be achieved in: 2 weeks  1. Independent in HEP and progression per patient tolerance, in order to prevent re-injury. [] Progressing: [] Met: [] Not Met: [] Adjusted      2. Patient will have a decrease in pain to facilitate improvement in movement, function, and ADLs as indicated by Functional Deficits. [] Progressing: [] Met: [] Not Met: [] Adjusted      Long Term Goals: To be achieved in: 8 weeks  1. Pt will improve quick dash to 0, indicating improved functional capacity .    [] Progressing: [] Met: [] Not Met: [] Adjusted      2. Patient will demonstrate increased AROM IR to T7 to allow for proper joint functioning as indicated by patients Functional Deficits. [] Progressing: [] Met: [] Not Met: [] Adjusted      3. Patient will demonstrate an increase in Strength of abduction/IR to 5/5 to allow for proper functional mobility as indicated by patients Functional Deficits. [] Progressing: [] Met: [] Not Met: [] Adjusted      4. Patient will return to functional activities without increased symptoms or restriction. [] Progressing: [] Met: [] Not Met: [] Adjusted      5. Pt will return to gym workouts and swimming. [] Progressing: [] Met: [] Not Met: [] Adjusted               ASSESSMENT: Pt yasmany session well. Still struggling with internal ROM. Patient received education on their current pathology and how their condition effects them with their functional activities. Patient understood discussion and questions were answered. Patient understands their activity limitations and understands rational for treatment progression. Pt educated on plan of care and HEP, if worsening symptoms to d/c that exercise. PLAN: See eval  [x] Continue per plan of care [] Alter current plan (see comments above)  [] Plan of care initiated [] Hold pending MD visit [] Discharge      Electronically signed by:  Aris Rondon PT    Note: If patient does not return for scheduled/ recommended follow up visits, this note will serve as a discharge from care along with most recent update on progress.

## 2023-01-31 ENCOUNTER — HOSPITAL ENCOUNTER (OUTPATIENT)
Dept: PHYSICAL THERAPY | Age: 38
Setting detail: THERAPIES SERIES
Discharge: HOME | End: 2023-01-31
Payer: COMMERCIAL

## 2023-01-31 PROCEDURE — 97110 THERAPEUTIC EXERCISES: CPT

## 2023-01-31 PROCEDURE — 97112 NEUROMUSCULAR REEDUCATION: CPT

## 2023-01-31 NOTE — FLOWSHEET NOTE
[facility], [place] [address] [place] [address]  Phone: [phone]   Fax:     [phone]      Physical Therapy Treatment Note/ Progress Report:           Date:  2023    Patient Name:  Fatou Alvarez    :  1985  MRN: [id number]  Restrictions/Precautions:    Medical/Treatment Diagnosis Information:  Diagnosis: M75.111 (ICD-10-CM) - Incomplete tear of right rotator cuff, unspecified whether traumatic  Treatment Diagnosis: Right shoulder pain, decreased strength  Insurance/Certification information:  [facility]  Physician Information:   Tomas Patino MD  Has the plan of care been signed (Y/N):        []  Yes  [x]  No     Date of Patient follow up with Physician:     Assessment Summary: Fatou is a 37 y.o. female reporting to OP PT with c/c of right shoulder pain which has been occurring for about 6 months without known MARISA. Pt is noted to have mild reduction in internal ROm and significant reduction in abduction and internal rotation strength. She does have a positive lift of test.       Date Range for reporting period:  Beginnin23  Endin23      Recertification will be due (POC Duration  / 90 days whichever is less): 3/17/23          Visit # Insurance Allowable Auth Required   In Person  No infor []  Yes     []  No    Tele Health   []  Yes     []  No    Total 3         Functional Scale: Quick Dash 14%   Date assessed:     Latex Allergy:  [x]NO      []YES  Preferred Language for Healthcare:   [x]English       []other:      Pain level:  0/10         SUBJECTIVE:  Pt states did a lot of dancing over the weekend. Shoulder feels fine right now but wall pushes still bothersome.         OBJECTIVE:             RESTRICTIONS/PRECAUTIONS: Latexa llergy    Exercises/Interventions: HEP code: [id number]  Therapeutic Ex (42166) HEP 23   Warm-up       Pulleys       UBE   2'/2', Lv 4 2'/2', Lv 4        TABLE       Supine concentric circles x X30 ea X30 ea, 2# X30 ea, 2#   ABCs   X1, 2# X1, 2#   Supine serratus punch x x30 X30, 2#    Supine horizontal abd/adduction    X20, 1#   Supine 90/90 IR/ER    x20   SL Concentric circles x X30 ea     SL Abduction                                   SEATED       UT stretch x 30\"x2  30\"   Levator stretch x 30\"x2  30\"   Lateral flexion isometric    X5 B, 10\"   Rotational isometric    X5 B, 10\"          STANDING       IR isometric x 10\"x5 10\"x5    ER Isometric x 10\"x5 10\"x5    Abduction isometric x 10\"x5 10\"x5    Abduction Eccentric   X10, 2#    Rows x X30, BTB     Extension x X20, BTB     IR band    X30, OTB   ER band    X30, OTB   Scapular depressions x      Wall weight shift   x20 x20   Wall serratus punch    x20   Extension walk away   x10    IR walk away   x10    Bicep curl   X30, 3#    Bicep curl w/ extension x   X20, BTB   IR behind back   X15 ea, 2#    HA x   X20, RTB   Median nerve glide x   x10     Manual: PROM into shoulder flexion, abduction, ER and IR in neutral, 45° and 60° abduction, STM to biceps and subscap  5'      Therapeutic Exercise and NMR EXR  [x] (39738) Provided verbal/tactile cueing for activities related to strengthening, flexibility, endurance, ROM  for improvements in scapular, scapulothoracic and UE control with self care, reaching, carrying, lifting, house/yardwork, driving/computer work.    [] (16813) Provided verbal/tactile cueing for activities related to improving balance, coordination, kinesthetic sense, posture, motor skill, proprioception  to assist with  scapular, scapulothoracic and UE control with self care, reaching, carrying, lifting, house/yardwork, driving/computer work.     Therapeutic Activities:    [x] (16936 or 13257) Provided verbal/tactile cueing for activities related to improving balance, coordination, kinesthetic sense, posture, motor skill, proprioception and motor activation to allow for proper function of scapular, scapulothoracic and UE control with self care, carrying, lifting, driving/computer work. Home Exercise Program:    [x] (30475) Reviewed/Progressed HEP activities related to strengthening, flexibility, endurance, ROM of scapular, scapulothoracic and UE control with self care, reaching, carrying, lifting, house/yardwork, driving/computer work  [] (55460) Reviewed/Progressed HEP activities related to improving balance, coordination, kinesthetic sense, posture, motor skill, proprioception of scapular, scapulothoracic and UE control with self care, reaching, carrying, lifting, house/yardwork, driving/computer work      Manual Treatments:  PROM / STM / Oscillations-Mobs:  G-I, II, III, IV (PA's, Inf., Post.)  [x] (41997) Provided manual therapy to mobilize soft tissue/joints of cervical/CT, scapular GHJ and UE for the purpose of modulating pain, promoting relaxation,  increasing ROM, reducing/eliminating soft tissue swelling/inflammation/restriction, improving soft tissue extensibility and allowing for proper ROM for normal function with self care, reaching, carrying, lifting, house/yardwork, driving/computer work    Modalities:     [] GAME READY (VASO)- for significant edema, swelling, pain control. Charges:  Timed Code Treatment Minutes: 46   Total Treatment Minutes:  46   BWC:  TE TIME:  NMR TIME:  MANUAL TIME:  TA  UNTIMED MINUTES:  Medicare Total:   34  12              [] EVAL (LOW) 17486 (typically 20 minutes face-to-face)  [] EVAL (MOD) 13474 (typically 30 minutes face-to-face)  [] EVAL (HIGH) 65396 (typically 45 minutes face-to-face)  [] RE-EVAL     [x] MA(23709) 2     [] IONTO  [x] NMR (39394) 1     [] VASO  [] Manual (75975) x     [] Other:  [] TA x      [] Mech Traction (88726)  [] ES(attended) (08055)      [] ES (un) (00551):           GOALS:     Patient stated goal: Reduce pain, return to workouts. Therapist goals for Patient:   Short Term Goals: To be achieved in: 2 weeks  1.  Independent in HEP and progression per patient tolerance, in order to prevent re-injury. [] Progressing: [] Met: [] Not Met: [] Adjusted      2. Patient will have a decrease in pain to facilitate improvement in movement, function, and ADLs as indicated by Functional Deficits. [] Progressing: [] Met: [] Not Met: [] Adjusted      Long Term Goals: To be achieved in: 8 weeks  1. Pt will improve quick dash to 0, indicating improved functional capacity . [] Progressing: [] Met: [] Not Met: [] Adjusted      2. Patient will demonstrate increased AROM IR to T7 to allow for proper joint functioning as indicated by patients Functional Deficits. [] Progressing: [] Met: [] Not Met: [] Adjusted      3. Patient will demonstrate an increase in Strength of abduction/IR to 5/5 to allow for proper functional mobility as indicated by patients Functional Deficits. [] Progressing: [] Met: [] Not Met: [] Adjusted      4. Patient will return to functional activities without increased symptoms or restriction. [] Progressing: [] Met: [] Not Met: [] Adjusted      5. Pt will return to gym workouts and swimming. [] Progressing: [] Met: [] Not Met: [] Adjusted               ASSESSMENT: Pt yasmany session well. Still struggling with internal ROM. Addressed some more adduction/abduction motions, bicep curl to get more extensions. Patient received education on their current pathology and how their condition effects them with their functional activities. Patient understood discussion and questions were answered. Patient understands their activity limitations and understands rational for treatment progression. Pt educated on plan of care and HEP, if worsening symptoms to d/c that exercise.            PLAN: See eval  [x] Continue per plan of care [] Alter current plan (see comments above)  [] Plan of care initiated [] Hold pending MD visit [] Discharge      Electronically signed by:  Nika Armstrong, PT    Note: If patient does not return for scheduled/ recommended follow up visits, this note will serve as a discharge from care along with most recent update on progress.

## 2023-02-07 ENCOUNTER — HOSPITAL ENCOUNTER (OUTPATIENT)
Dept: PHYSICAL THERAPY | Age: 38
Setting detail: THERAPIES SERIES
Discharge: HOME | End: 2023-02-07
Payer: COMMERCIAL

## 2023-02-07 PROCEDURE — 97112 NEUROMUSCULAR REEDUCATION: CPT

## 2023-02-07 PROCEDURE — 97110 THERAPEUTIC EXERCISES: CPT

## 2023-02-07 NOTE — FLOWSHEET NOTE
[address] [place] [address] and Sports Rehabilitation[address], [address] [place] [address] [address]  Phone: [phone]   Fax:     [phone]      Physical Therapy Treatment Note/ Progress Report:           Date:  2023    Patient Name:  Dianna Pardo    :  1985  MRN: [id number]  Restrictions/Precautions:    Medical/Treatment Diagnosis Information:  Diagnosis: M75.111 (ICD-10-CM) - Incomplete tear of right rotator cuff, unspecified whether traumatic  Treatment Diagnosis: Right shoulder pain, decreased strength  Insurance/Certification information:  [facility]  Physician Information:   Carmen Novoa MD  Has the plan of care been signed (Y/N):        []  Yes  [x]  No     Date of Patient follow up with Physician:     Assessment Summary: Hal Hallman is a 40 y.o. female reporting to OP PT with c/c of right shoulder pain which has been occurring for about 6 months without known MARISA. Pt is noted to have mild reduction in internal ROm and significant reduction in abduction and internal rotation strength. She does have a positive lift of test.       Date Range for reporting period:  Beginnin23  Endin      Recertification will be due (POC Duration  / 90 days whichever is less): 3/17/23          Visit # Insurance Allowable Auth Required   In Person  60 no auth []  Yes     []  No    Tele Health   []  Yes     []  No    Total 4         Functional Scale: Quick Dash 14%   Date assessed:     Latex Allergy:  [x]NO      []YES  Preferred Language for Healthcare:   [x]English       []other:      Pain level:  0/10         SUBJECTIVE:  Pt states shoulder still hurts, feels  about the same.          OBJECTIVE:             RESTRICTIONS/PRECAUTIONS: Aleksandra Games    Exercises/Interventions: HEP code: [id number]  Therapeutic Ex (80978) HEP 23   Warm-up        Pulleys        UBE   2'/2', Lv 4 2'/2', Lv 4 2'/2', Lv 4           TABLE        Supine concentric circles x X30 ea X30 ea, 2# X30 ea, 2# X30 ea, 3#   ABCs   X1, 2# X1, 2#    Supine serratus punch x x30 X30, 2#     Supine horizontal abd/adduction    X20, 1# X30, 2#   Supine 90/90 IR/ER    x20    SL Concentric circles x X30 ea      SL Abduction                                        SEATED        UT stretch x 30\"x2  30\"    Levator stretch x 30\"x2  30\"    Lateral flexion isometric x   X5 B, 10\"    Rotational isometric x   X5 B, 10\"            STANDING        IR isometric x 10\"x5 10\"x5     ER Isometric x 10\"x5 10\"x5     Abduction isometric x 10\"x5 10\"x5     Abduction Eccentric   X10, 2#     Rows x X30, BTB      Extension x X20, BTB      IR band    X30, OTB    ER band    X30, OTB    Scapular depressions x       Wall weight shift   x20 x20 x30   Wall serratus punch    x20 x30   Extension walk away   x10     IR walk away   x10     Bicep curl   X30, 3#     Bicep curl w/ extension x   X20, BTB 10x3, BTB   IR behind back   X15 ea, 2#  X15 ea, 2#   ER ball behind head     X10 ea, 2#   HA x   X20, RTB    Median nerve glide x   x10                      Manual: PROM into shoulder flexion, abduction, ER and IR in neutral, 45° and 60° abduction, STM to biceps and subscap  5'      Therapeutic Exercise and NMR EXR  [x] (35529) Provided verbal/tactile cueing for activities related to strengthening, flexibility, endurance, ROM  for improvements in scapular, scapulothoracic and UE control with self care, reaching, carrying, lifting, house/yardwork, driving/computer work.    [] (80729) Provided verbal/tactile cueing for activities related to improving balance, coordination, kinesthetic sense, posture, motor skill, proprioception  to assist with  scapular, scapulothoracic and UE control with self care, reaching, carrying, lifting, house/yardwork, driving/computer work.     Therapeutic Activities:    [x] (55328 or 85827) Provided verbal/tactile cueing for activities related to improving balance, coordination, kinesthetic sense, posture, motor skill, proprioception and motor activation to allow for proper function of scapular, scapulothoracic and UE control with self care, carrying, lifting, driving/computer work. Home Exercise Program:    [x] (43988) Reviewed/Progressed HEP activities related to strengthening, flexibility, endurance, ROM of scapular, scapulothoracic and UE control with self care, reaching, carrying, lifting, house/yardwork, driving/computer work  [] (95930) Reviewed/Progressed HEP activities related to improving balance, coordination, kinesthetic sense, posture, motor skill, proprioception of scapular, scapulothoracic and UE control with self care, reaching, carrying, lifting, house/yardwork, driving/computer work      Manual Treatments:  PROM / STM / Oscillations-Mobs:  G-I, II, III, IV (PA's, Inf., Post.)  [x] (70873) Provided manual therapy to mobilize soft tissue/joints of cervical/CT, scapular GHJ and UE for the purpose of modulating pain, promoting relaxation,  increasing ROM, reducing/eliminating soft tissue swelling/inflammation/restriction, improving soft tissue extensibility and allowing for proper ROM for normal function with self care, reaching, carrying, lifting, house/yardwork, driving/computer work    Modalities:     [] GAME READY (VASO)- for significant edema, swelling, pain control. Charges:  Timed Code Treatment Minutes: 46   Total Treatment Minutes:  46   BWC:  TE TIME:  NMR TIME:  MANUAL TIME:  TA  UNTIMED MINUTES:  Medicare Total:   34  12              [] EVAL (LOW) 52133 (typically 20 minutes face-to-face)  [] EVAL (MOD) 30891 (typically 30 minutes face-to-face)  [] EVAL (HIGH) 69506 (typically 45 minutes face-to-face)  [] RE-EVAL     [x] FP(24281) 2     [] IONTO  [x] NMR (80972) 1     [] VASO  [] Manual (62854) x     [] Other:  [] TA x      [] Mech Traction (79795)  [] ES(attended) (87662)      [] ES (un) (61304):           GOALS:     Patient stated goal: Reduce pain, return to workouts. Therapist goals for Patient:   Short Term Goals: To be achieved in: 2 weeks  1. Independent in HEP and progression per patient tolerance, in order to prevent re-injury. [] Progressing: [] Met: [] Not Met: [] Adjusted      2. Patient will have a decrease in pain to facilitate improvement in movement, function, and ADLs as indicated by Functional Deficits. [] Progressing: [] Met: [] Not Met: [] Adjusted      Long Term Goals: To be achieved in: 8 weeks  1. Pt will improve quick dash to 0, indicating improved functional capacity . [] Progressing: [] Met: [] Not Met: [] Adjusted      2. Patient will demonstrate increased AROM IR to T7 to allow for proper joint functioning as indicated by patients Functional Deficits. [] Progressing: [] Met: [] Not Met: [] Adjusted      3. Patient will demonstrate an increase in Strength of abduction/IR to 5/5 to allow for proper functional mobility as indicated by patients Functional Deficits. [] Progressing: [] Met: [] Not Met: [] Adjusted      4. Patient will return to functional activities without increased symptoms or restriction. [] Progressing: [] Met: [] Not Met: [] Adjusted      5. Pt will return to gym workouts and swimming. [] Progressing: [] Met: [] Not Met: [] Adjusted               ASSESSMENT: Pt yasmany session well. Continues to have tenderness near bicipital groove. Has been completing HEP regularly but not much relief so far per reports. Patient received education on their current pathology and how their condition effects them with their functional activities. Patient understood discussion and questions were answered. Patient understands their activity limitations and understands rational for treatment progression. Pt educated on plan of care and HEP, if worsening symptoms to d/c that exercise.            PLAN: See eval  [x] Continue per plan of care [] Alter current plan (see comments above)  [] Plan of care initiated [] Hold pending MD visit [] Discharge      Electronically signed by:  Nathaly Gaffney PT    Note: If patient does not return for scheduled/ recommended follow up visits, this note will serve as a discharge from care along with most recent update on progress.

## 2023-02-13 ENCOUNTER — HOSPITAL ENCOUNTER (OUTPATIENT)
Dept: PHYSICAL THERAPY | Age: 38
Setting detail: THERAPIES SERIES
Discharge: HOME | End: 2023-02-13
Payer: COMMERCIAL

## 2023-02-13 PROCEDURE — 97110 THERAPEUTIC EXERCISES: CPT

## 2023-02-13 PROCEDURE — 97016 VASOPNEUMATIC DEVICE THERAPY: CPT

## 2023-02-13 PROCEDURE — 97112 NEUROMUSCULAR REEDUCATION: CPT

## 2023-02-13 NOTE — FLOWSHEET NOTE
[address] [facility] and [facility], [address] [place] [address], [address] [place] [address] [address]  Phone: [phone]   Fax:     [phone]      Physical Therapy Treatment Note/ Progress Report:           Date:  2023    Patient Name:  Mena Durham    :  1985  MRN: [id number]  Restrictions/Precautions:    Medical/Treatment Diagnosis Information:  Diagnosis: M75.111 (ICD-10-CM) - Incomplete tear of right rotator cuff, unspecified whether traumatic  Treatment Diagnosis: Right shoulder pain, decreased strength  Insurance/Certification information:  [facility]  Physician Information:   Leeann Caal MD  Has the plan of care been signed (Y/N):        []  Yes  [x]  No     Date of Patient follow up with Physician:     Assessment Summary: Gayathri Chávez is a 40 y.o. female reporting to OP PT with c/c of right shoulder pain which has been occurring for about 6 months without known MARISA. Pt is noted to have mild reduction in internal ROm and significant reduction in abduction and internal rotation strength. She does have a positive lift of test.       Date Range for reporting period:  Beginnin23  Endin      Recertification will be due (POC Duration  / 90 days whichever is less): 3/17/23          Visit # Insurance Allowable Auth Required   In Person  60 no auth []  Yes     []  No    Tele Health   []  Yes     []  No    Total 5         Functional Scale: Quick Dash 14%   Date assessed:     Latex Allergy:  [x]NO      []YES  Preferred Language for Healthcare:   [x]English       []other:      Pain level:  0/10         SUBJECTIVE:  Pt states shoulder is still sore.          OBJECTIVE:             RESTRICTIONS/PRECAUTIONS: Latex allergy    Exercises/Interventions: HEP code: [id number]  Therapeutic Ex (70191) HEP 23   Warm-up       Pulleys       UBE  2'/2', Lv 4 2'/2', Lv 4 2'/2', Lv 4          TABLE       Supine concentric circles x X30 ea, 2# Sajan Jamison, 3Iliana X30 ea, 3#   ABCs  X1, 2#  X1, 3#   Supine serratus punch x      Supine horizontal abd/adduction  X20, 1# X30, 2# X20, 3#   Supine 90/90 IR/ER  x20  X20, 3#   SL Concentric circles x      SL Abduction                                   SEATED       UT stretch x 30\"     Levator stretch x 30\"     Lateral flexion isometric x X5 B, 10\"     Rotational isometric x X5 B, 10\"            STANDING       IR isometric x      ER Isometric x      Abduction isometric x      Abduction Eccentric       Rows x   X30, BlkTB   Extension x      IR band x X30, OTB  X20, RTB   ER band x X30, OTB  X20, RTB   Scapular depressions x      Wall weight shift  x20 x30 x20   Wall serratus punch  x20 x30    Extension walk away       Wall ball circles    X20 ea, 2#   IR walk away       Bicep curl       Bicep curl w/ extension x X20, BTB 10x3, BTB X30, BlkTB   IR behind back   X15 ea, 2# X15 ea, 2#   ER ball behind head   X10 ea, 2# X10 ea, 2#   HA x X20, RTB     Median nerve glide x x10     Quick ball drops x   X20, baseball            Manual: PROM into shoulder flexion, abduction, ER and IR in neutral, 45° and 60° abduction, STM to biceps and subscap  5'      Therapeutic Exercise and NMR EXR  [x] (69415) Provided verbal/tactile cueing for activities related to strengthening, flexibility, endurance, ROM  for improvements in scapular, scapulothoracic and UE control with self care, reaching, carrying, lifting, house/yardwork, driving/computer work.    [] (07606) Provided verbal/tactile cueing for activities related to improving balance, coordination, kinesthetic sense, posture, motor skill, proprioception  to assist with  scapular, scapulothoracic and UE control with self care, reaching, carrying, lifting, house/yardwork, driving/computer work.     Therapeutic Activities:    [x] (55953 or 53278) Provided verbal/tactile cueing for activities related to improving balance, coordination, kinesthetic sense, posture, motor skill, proprioception and motor activation to allow for proper function of scapular, scapulothoracic and UE control with self care, carrying, lifting, driving/computer work. Home Exercise Program:    [x] (95508) Reviewed/Progressed HEP activities related to strengthening, flexibility, endurance, ROM of scapular, scapulothoracic and UE control with self care, reaching, carrying, lifting, house/yardwork, driving/computer work  [] (55631) Reviewed/Progressed HEP activities related to improving balance, coordination, kinesthetic sense, posture, motor skill, proprioception of scapular, scapulothoracic and UE control with self care, reaching, carrying, lifting, house/yardwork, driving/computer work      Manual Treatments:  PROM / STM / Oscillations-Mobs:  G-I, II, III, IV (PA's, Inf., Post.)  [x] (86054) Provided manual therapy to mobilize soft tissue/joints of cervical/CT, scapular GHJ and UE for the purpose of modulating pain, promoting relaxation,  increasing ROM, reducing/eliminating soft tissue swelling/inflammation/restriction, improving soft tissue extensibility and allowing for proper ROM for normal function with self care, reaching, carrying, lifting, house/yardwork, driving/computer work    Modalities:     [x] GAME READY (VASO)- for significant edema, swelling, pain control. 10'    Charges:  Timed Code Treatment Minutes: 40   Total Treatment Minutes:  50   BWC:  TE TIME:  NMR TIME:  MANUAL TIME:  TA  UNTIMED MINUTES:  Medicare Total:   30  10              [] EVAL (LOW) 86624 (typically 20 minutes face-to-face)  [] EVAL (MOD) 00535 (typically 30 minutes face-to-face)  [] EVAL (HIGH) 43843 (typically 45 minutes face-to-face)  [] RE-EVAL     [x] TW(02009) 2     [] IONTO  [x] NMR (32779) 1     [x] VASO  [] Manual (11203) x     [] Other:  [] TA x      [] Mech Traction (50262)  [] ES(attended) (82279)      [] ES (un) (26333):           GOALS:     Patient stated goal: Reduce pain, return to workouts.       Therapist goals for Patient:   Short Term Goals: To be achieved in: 2 weeks  1. Independent in HEP and progression per patient tolerance, in order to prevent re-injury. [] Progressing: [] Met: [] Not Met: [] Adjusted      2. Patient will have a decrease in pain to facilitate improvement in movement, function, and ADLs as indicated by Functional Deficits. [] Progressing: [] Met: [] Not Met: [] Adjusted      Long Term Goals: To be achieved in: 8 weeks  1. Pt will improve quick dash to 0, indicating improved functional capacity . [] Progressing: [] Met: [] Not Met: [] Adjusted      2. Patient will demonstrate increased AROM IR to T7 to allow for proper joint functioning as indicated by patients Functional Deficits. [] Progressing: [] Met: [] Not Met: [] Adjusted      3. Patient will demonstrate an increase in Strength of abduction/IR to 5/5 to allow for proper functional mobility as indicated by patients Functional Deficits. [] Progressing: [] Met: [] Not Met: [] Adjusted      4. Patient will return to functional activities without increased symptoms or restriction. [] Progressing: [] Met: [] Not Met: [] Adjusted      5. Pt will return to gym workouts and swimming. [] Progressing: [] Met: [] Not Met: [] Adjusted               ASSESSMENT: Pt yasmany session well. Continues to have tenderness near bicipital groove. Has been completing HEP regularly but not much relief so far per reports. Addition of vaso today to help facilitate pain reduction        Patient received education on their current pathology and how their condition effects them with their functional activities. Patient understood discussion and questions were answered. Patient understands their activity limitations and understands rational for treatment progression. Pt educated on plan of care and HEP, if worsening symptoms to d/c that exercise.            PLAN: See eval  [x] Continue per plan of care [] Alter current plan (see comments above)  [] Plan of care initiated [] Hold pending MD visit [] Discharge      Electronically signed by:  Jessica Barron PT    Note: If patient does not return for scheduled/ recommended follow up visits, this note will serve as a discharge from care along with most recent update on progress.

## 2023-02-14 ENCOUNTER — APPOINTMENT (OUTPATIENT)
Dept: PHYSICAL THERAPY | Age: 38
End: 2023-02-14
Payer: COMMERCIAL

## 2023-02-15 ENCOUNTER — OFFICE VISIT (OUTPATIENT)
Dept: ORTHOPEDIC SURGERY | Age: 38
End: 2023-02-15
Payer: COMMERCIAL

## 2023-02-15 VITALS — HEIGHT: 70 IN | RESPIRATION RATE: 16 BRPM | BODY MASS INDEX: 30.18 KG/M2 | WEIGHT: 210.8 LBS

## 2023-02-15 DIAGNOSIS — M75.111 INCOMPLETE TEAR OF RIGHT ROTATOR CUFF, UNSPECIFIED WHETHER TRAUMATIC: Primary | ICD-10-CM

## 2023-02-15 PROCEDURE — 99214 OFFICE O/P EST MOD 30 MIN: CPT | Performed by: STUDENT IN AN ORGANIZED HEALTH CARE EDUCATION/TRAINING PROGRAM

## 2023-02-15 RX ORDER — TRIAMCINOLONE ACETONIDE 40 MG/ML
40 INJECTION, SUSPENSION INTRA-ARTICULAR; INTRAMUSCULAR ONCE
Status: CANCELLED | OUTPATIENT
Start: 2023-02-15 | End: 2023-02-15

## 2023-02-15 RX ORDER — LIDOCAINE HYDROCHLORIDE 10 MG/ML
4 INJECTION, SOLUTION INFILTRATION; PERINEURAL ONCE
Status: CANCELLED | OUTPATIENT
Start: 2023-02-15 | End: 2023-02-15

## 2023-02-15 NOTE — LETTER
CT called and informed pt is ready for scan.     Surgery Scheduling Form:    Patient Name:  Mirela Elias  Patient :  1985     Patient MR#:  [id number]    Patient Address:  [address] [place] [address] [address]    Surgeon:  Elizabeth Clifford. Estefanía Millard M.D.    PCP:  Jett Wang MD  Insurance:    Payer/Plan Subscr  Sub. Ins. ID Effective Group Num   1. BCBS - BCBS -* DINESH MELARA* 1985 [id number] 20 [id number]     Diagnosis:  Right shoulder incomplete rotator cuff tear M75.111, superior labrum anterior posterior tear, biceps tendon subluxation    Operation:  Right shoulder arthroscopy, biceps tenodesis, possible rotator cuff repair. [id number], [id number]    Location:  [facility]  Surgeon's Scheduling Instruction:  elective  Requested Date:  2023   OR Time:  12:30pm     Patient Arrival Time:  11:00am  OR Time Required:  75  Minutes    Anesthesia:  General + block  Surgical Assistant required:  Yes   Equipment: Arthrex  Standard C-Arm:  No    Mini C-Arm:  No  Status:  Outpatient   PAT Required:  Yes    Comments:   History and Physical: Dr. Estefanía Millard will perform H+P the day of surgery  Covid test: As of 3/23/2022: no test needed if symptom free              Elizabeth Clifford. Estefanía Millard MD      2/15/23 6:38 PM EST                 ORTHOPEDIC SURGERY  PRE-SURGICAL PHYSICIANS ORDERS  Patient Name: Mirela Elias  Surgery Date: 3/23/2023  History and Physical:   [] [facility]    [x]By Surgeon   []By PCP    Referrals:  [x]Medical /Cardiac Clearance per anesthesia guidelines  []Joint Replacement Class    Pre Admission Testin.  Initiate PAT Protocol  [] Hemoglobin & Hematocrit  [] Comprehensive Metabolic Panel  [] CBC with differential  [] Type & Screen  [] CBC without differential  [] Type and Crossmatch ____________units  [] PT/INR    [] A1C       [] Vitamin D Level    [] PTT       [] Chest X-ray   [] Urinalysis      [] EKG    [] Urinalysis with C & S      [] Nasal Swab for MRSA screening [] Basic Metabolic Panel     [x] Other Defer to anesthesia for pre-admission testing orders  Orders to be initiated upon admission to Same Day Surgery:   [] Fasting blood sugar by finger stick  [] PT/INR (pt. takes warfarin / coumadin)  [] HCG  [x] Other Defer to anesthesia   IV Fluid and Medications:  1. Place IV catheter for IV access. The RN may use 0.1 ml of 1% Lidocaine SQ per site for IV starts up to a maximum of 0.5 mL  2. Infuse Lactated Ringers IV at 50 m/hr. Diabetic or renally impaired, infuse 0.9% Normal Saline at 50 ml/hour  3. Cefazolin 2g IV OR 3 g if >120 kg, given within one hour of incision time. If allergic to Penicillin or Cephalosporins, give within two hours of incision time Vancomycin 1 Gram IV if less than or equal to 80 kg OR 1.5 Grams if  kg OR 2 Grams If > 120 kg. If allergic to Penicillin or Cephalosporins and Vancomycin give Clindamycin 900mg within 1 hour of incision. 4. If + MRSA nasal swab give Vancomycin 1 Gram IV if less than or equal to 80 kg OR 1.5 Grams if  kg OR  2 Grams if > 120 kg  5.  Other Medications: Tranexamic Acid 1g IV [] Pre-op only []Pre-Op and Intra-op  [] Ortho mix  []Decadron 10mg IV Preop unless patient is diabetic     []  Oxycodone ER 20mg for patients less than 72years old preop oral x 1  []Oxycodone ER 10mg for patients 65 years or older preop oral x 1  [] Additional:    Physician Signature  2/16/2023    10:39 AM        Additional Orders:  1. [] Knee high   []Thigh high   anti-embolism stockings and [x] antithrombic compression pumps (apply in Pre-op)         Physician Signature  2/16/2023    10:39 AM     Fax to Joi Hsu [phone] [id number] Revised 8-22

## 2023-02-15 NOTE — PROGRESS NOTES
CHIEF COMPLAINT: Right shoulder pain    History:    Gael Pereira is a 40 y.o. right handed female here for right shoulder follow-up. Initial history: referred by Deidra Fonseca MD for Sports Medicine consultation for evaluation and treatment of Right shoulder pain. This is evaluated as a personal injury. The pain began 6 months ago. Pain is rated as a 6/10. There was not an injury. Pain is located laterally, posteriorly and along her trapezius. And feels are worse with laying on her side, reaching behind her back, and repetitive forward and backward motion while using her vacuum . She sees a chiropractor. The patient has not had PT. The patient has not had an injection. The patient has tried NSAIDs with relief. The patient has not tried ice. Patient's occupation is     Interval History: She has completed a trial of Pt. She states pain has not improved. Pain is located anterior at the bicipital groove and anterior lateral. She rates pain 6/10. It is worse with pushing herself out of bed, catching doors, carrying, and reaching behind her back. It interferes with her daily activity. She reports being very active prior to her injury and now is unable to do some of the activities she enjoys such as dancing. Past Medical History:   Diagnosis Date    Anxiety 2020    Treated and released, neuropsych center of [facility]    Depression 2020    Treated and released, neuropsych center of [facility]       No past surgical history on file. Current Outpatient Medications on File Prior to Visit   Medication Sig Dispense Refill    Ferrous Sulfate (IRON PO) Take by mouth      Ascorbic Acid (VITAMIN C PO) Take by mouth      levonorgestrel (MIRENA, 52 MG,) IUD 52 mg 1 each by Intrauterine route once      cetirizine (ZYRTEC) 10 MG tablet Take 10 mg by mouth daily       No current facility-administered medications on file prior to visit.        Allergies Allergen Reactions    Codeine Nausea And Vomiting    Gluten Meal Rash     Digestive problems and eczema       Social History     Socioeconomic History    Marital status:      Spouse name: Not on file    Number of children: Not on file    Years of education: Not on file    Highest education level: Not on file   Occupational History    Occupation:    Tobacco Use    Smoking status: Never    Smokeless tobacco: Never   Vaping Use    Vaping Use: Never used   Substance and Sexual Activity    Alcohol use: Never    Drug use: Never    Sexual activity: Yes     Partners: Male     Birth control/protection: I.U.D. Other Topics Concern    Not on file   Social History Narrative    Not on file     Social Determinants of Health     Financial Resource Strain: Low Risk     Difficulty of Paying Living Expenses: Not hard at all   Food Insecurity: No Food Insecurity    Worried About [address] in the Last Year: Never true    [address] in the Last Year: Never true   Transportation Needs: No Transportation Needs    Lack of Transportation (Medical): No    Lack of Transportation (Non-Medical):  No   Physical Activity: Sufficiently Active    Days of Exercise per Week: 5 days    Minutes of Exercise per Session: 30 min   Stress: Not on file   Social Connections: Not on file   Intimate Partner Violence: Not At Risk    Fear of Current or Ex-Partner: No    Emotionally Abused: No    Physically Abused: No    Sexually Abused: No   Housing Stability: Low Risk     Unable to Pay for Housing in the Last Year: No    Number of Places Lived in the Last Year: 1    Unstable Housing in the Last Year: No       Family History   Problem Relation Age of Onset    No Known Problems Mother     No Known Problems Father     No Known Problems Sister     No Known Problems Brother     No Known Problems Sister     Heart Attack Paternal Grandfather            Physical Examination:      Vital signs:  Resp 16   Ht 5' 10\" (1.778 m)   Wt 210 lb 12.8 oz (95.6 kg)   BMI 30.25 kg/m²     General:   alert, appears stated age, cooperative, and no distress   Right Shoulder   Active ROM:   forward flexion 180, external rotation 60, internal rotation T10. Left shoulder: forward flexion 180, external rotation 80, internal rotation T10. Joint Tenderness:   lateral acromial, bicipital groove   Neer:   negative   Ramirez:   negative   Strength:   4/5 Supraspinatus, 4/5 External rotation    Left shoulder: 5/5 Supraspinatus, External rotation    Drop-arm test:   negative   Belly-press test:   negative   Lift off  positive   Bear-hug test:   negative   Speed's test:  Positive   Bicipital groove tenderness:  positive   Patterson's test:  Positive   Cross-body adduction test:   Pain laterally    AC joint tenderness:   negative        Imaging   Right Shoulder X-Ray:   AC Joint: narrowing moderate  Glenohumeral joint: no abnormalities noted  Elevation humeral head: absent    Right Shoulder MRI:   1. Biceps pulley mechanism injury, with mild subscapularis tendinopathy and humeral sided    delamination of the superior insertional fibers. Adjacent mild tendinopathy of the long head    biceps tendon, with tendon partially subluxed at the bicipital groove. 2. Nondisplaced SLAP type 2B tear of the superior glenoid labrum. 3. Mild AC joint arthrosis, with penetrating erosions and localized osteoedema of the distal    clavicle. No acute AC joint injury or separation. 4. The remainder of the rotator cuff complex is intact. Assessment:      Right shoulder partial rotator cuff open (subscapularis) tear  Right shoulder long head biceps tendon subluxation  Right shoulder SLAP tear        Plan:      Natural history and expected course discussed. Questions answered. We again discussed that her biceps symptoms may be secondary to her SLAP tear.   We also discussed that she does have an articular sided partial tear of her subscapularis which likely result in the biceps tendon subluxation. She has positive lift off test today. There is also possibility that she may have a slight full thickness upper border tear resulting in the biceps tendon subluxation. She has failed non operative treatment with physical therapy. She has elected to proceed with surgery as previously discussed with Dr. Violetta Osorio. She is active in dancing and overhead activity and would like to proceed with biceps tenodesis rather than tenotomy as previously discussed. I had an extensive discussion with Ms. Misty Jhaveri and/or family regarding the natural history, etiology, and long term consequences of her condition. I have presented reasonable alternatives to the patient's proposed care, treatment, and services. I have outlined a treatment plan with them and, in my opinion, surgical intervention is indicated at this time. Discussion I have had encompassed risks, benefits, and side effects related to the alternatives and the risks related to not receiving the proposed care, treatment, and services. I have discussed the potential complications (including, but not limited to injury to nerve or blood vessel, infection, bleeding, DVT or PE, stiffness, incomplete pain relief, need for further surgery, and anesthetic complications), limitations, expectations, alternatives, and risks of the surgical procedure. We also discussed the importance of postoperative rehabilitation. She has had full opportunity to ask her questions. I have answered them all to her satisfaction. I feel that Ms. Misty Jhaveri understands our discussion today and she will provide written informed consent for the procedure. Dr. Violetta Osorio will perform H&P on the day of surgery. Plan for right shoulder arthroscopy, possible rotator cuff repair, biceps tenodesis and SLAP repair.              Sirisha Parker PA-C  Board Certified by the [employer] on Certification of [address] [facility] and [address]: This note was generated with use of a verbal recognition program and an attempt was made to check for errors. It is possible that there are still dictated errors within this office note. If so, please bring any significant errors to my attention for an addendum. All efforts were made to ensure that this office note is accurate.

## 2023-02-16 ENCOUNTER — TELEPHONE (OUTPATIENT)
Dept: ORTHOPEDIC SURGERY | Age: 38
End: 2023-02-16

## 2023-02-16 DIAGNOSIS — M75.111 INCOMPLETE TEAR OF RIGHT ROTATOR CUFF, UNSPECIFIED WHETHER TRAUMATIC: Primary | ICD-10-CM

## 2023-02-16 PROCEDURE — MISCCOLD COLD THERAPY UNIT AND PAD: Performed by: ORTHOPAEDIC SURGERY

## 2023-02-16 NOTE — TELEPHONE ENCOUNTER
Spoke with patient. She would like to have cold therapy unit ordered for  at [facility]. Patient is advised that she will need to pick it up on Wednesday, 3/22, pay $150 out of pocket at time of , and take unit with her to surgery. Patient verbalized understanding and agreed.

## 2023-02-16 NOTE — TELEPHONE ENCOUNTER
General Question     Subject: ORDER COLD THERAPY MACHINE  Patient and /or Facility Request: Jannie Currie  Contact Number: [phone]      PATIENT CALLED IN TO SEE HOW TO ORDER HER COLD THERAPY MACHINE FOR HER R SHOULDER AFTER SX. .. SX ON 3/23/23    PLEASE CALL PATIENT BACK AT THE ABOVE NUMBER. ..

## 2023-02-17 ENCOUNTER — TELEPHONE (OUTPATIENT)
Dept: ORTHOPEDIC SURGERY | Age: 38
End: 2023-02-17

## 2023-03-01 ENCOUNTER — TELEPHONE (OUTPATIENT)
Dept: ORTHOPEDIC SURGERY | Age: 38
End: 2023-03-01

## 2023-03-01 NOTE — TELEPHONE ENCOUNTER
Auth: NPR  Date: 03/23/23 thru 06/23/23  Reference # [id number]  Spoke with: None  Type of SX: Outpatient  Location:   CPT: 56515, 96762   DX: M75.111  SX area: Rt shoulder  Insurance: [employer]

## 2023-03-20 ENCOUNTER — TELEPHONE (OUTPATIENT)
Dept: ORTHOPEDIC SURGERY | Age: 38
End: 2023-03-20

## 2023-03-22 NOTE — H&P
History:    Uma Isidro is a 40 y.o. right handed female here for right shoulder f arthroscopy. Past Medical History        Past Medical History:   Diagnosis Date    Anxiety 2020     Treated and released, neuropsych center [facility]    Depression 2020     Treated and released, neuropsych [facility]            Past Surgical History   History reviewed. No pertinent surgical history. Current Outpatient Medications on File Prior to Visit   Medication Sig Dispense Refill    Ferrous Sulfate (IRON PO) Take by mouth        Ascorbic Acid (VITAMIN C PO) Take by mouth        levonorgestrel (MIRENA, 52 MG,) IUD 52 mg 1 each by Intrauterine route once        cetirizine (ZYRTEC) 10 MG tablet Take 10 mg by mouth daily          No current facility-administered medications on file prior to visit. Allergies   Allergen Reactions    Codeine Nausea And Vomiting    Gluten Meal Rash       Digestive problems and eczema         Social History               Socioeconomic History    Marital status:        Spouse name: Not on file    Number of children: Not on file    Years of education: Not on file    Highest education level: Not on file   Occupational History    Occupation:    Tobacco Use    Smoking status: Never    Smokeless tobacco: Never   Vaping Use    Vaping Use: Never used   Substance and Sexual Activity    Alcohol use: Never    Drug use: Never    Sexual activity: Yes       Partners: Male       Birth control/protection: I.U.D.    Other Topics Concern    Not on file   Social History Narrative    Not on file      Social Determinants of Health          Financial Resource Strain: Low Risk     Difficulty of Paying Living Expenses: Not hard at all   Food Insecurity: No Food Insecurity    Worried About [address] in the Last Year: Never true    [address] in the Last Year: Never true   Transportation Needs: No

## 2023-03-22 NOTE — DISCHARGE INSTRUCTIONS
DR. Chase Burch                  SHOULDER ARTHROSCOPY POSTOPERATIVE INSTRUCTIONS      ACTIVITIES:  Keep arm in sling after surgery. You may move your wrist and hand as much possible. Dr Harriett Nickerson will tell you when you can stop wearing the sling. DRESSING: After surgery, a bulky dressing, and sometimes an ice cuff will be applied to your shoulder. It is normal to have a moderate amount of blood-tinted fluid drainage on the dressing. Keep the dressing clean and dry. ICE/COOLING: Apply ice to your shoulder if you did not purchase an ice cuff. If you did purchase a cooling cuff, follow the instructions included with the cuff to keep it cold. Wear the cuff continuously for 72 hours postoperatively. You can ice intermittently (such as 30 minutes on, 30 minutes off). Make sure the ice or cooling cuff is not directly in contact with your skin. Prolonged contact can result in frostbite. After 3 days, use after exercising, or to help with pain and swelling, for 30 minutes, 3 to 5 times a day. DRIVING:  You may drive once you are out of your sling, have stopped your pain medication, and can use your shoulder normally. This may be a decision to be made between you and your physical therapist. Chapito Navake must be able to control the steering wheel comfortably. You are the one ultimately responsible when behind the wheel. SHOWERING: You may begin to shower 3 days after your surgery. Keep the incisions covered. Dr Harriett Nickerson will tell you if you need to wait longer. MEDICATION: Although you have a prescription for a strong pain medication, many patients are able to handle the discomfort postoperatively with a milder medication such as Extra-strength Tylenol. You may or may not (based on your surgery - Dr. Harriett Nickerson will let you know) also have had a prescription for an anti-inflammatory such as Celebrex or Naprosyn, and an anti-nausea medication such as Phenergan.

## 2023-03-23 ENCOUNTER — ANESTHESIA EVENT (OUTPATIENT)
Dept: OPERATING ROOM | Age: 38
End: 2023-03-23
Payer: COMMERCIAL

## 2023-03-23 ENCOUNTER — HOSPITAL ENCOUNTER (OUTPATIENT)
Age: 38
Setting detail: OUTPATIENT SURGERY
Discharge: HOME | End: 2023-03-23
Attending: ORTHOPAEDIC SURGERY | Admitting: ORTHOPAEDIC SURGERY
Payer: COMMERCIAL

## 2023-03-23 ENCOUNTER — ANESTHESIA (OUTPATIENT)
Dept: OPERATING ROOM | Age: 38
End: 2023-03-23
Payer: COMMERCIAL

## 2023-03-23 VITALS
TEMPERATURE: 97.2 F | HEART RATE: 90 BPM | HEIGHT: 70 IN | RESPIRATION RATE: 17 BRPM | DIASTOLIC BLOOD PRESSURE: 75 MMHG | OXYGEN SATURATION: 99 % | WEIGHT: 212 LBS | SYSTOLIC BLOOD PRESSURE: 114 MMHG | BODY MASS INDEX: 30.35 KG/M2

## 2023-03-23 DIAGNOSIS — M75.111 INCOMPLETE TEAR OF RIGHT ROTATOR CUFF, UNSPECIFIED WHETHER TRAUMATIC: Primary | ICD-10-CM

## 2023-03-23 LAB — HCG UR QL: NEGATIVE

## 2023-03-23 PROCEDURE — 6360000002 HC RX W HCPCS: Performed by: NURSE ANESTHETIST, CERTIFIED REGISTERED

## 2023-03-23 PROCEDURE — C1769 GUIDE WIRE: HCPCS | Performed by: ORTHOPAEDIC SURGERY

## 2023-03-23 PROCEDURE — 7100000000 HC PACU RECOVERY - FIRST 15 MIN: Performed by: ORTHOPAEDIC SURGERY

## 2023-03-23 PROCEDURE — 2580000003 HC RX 258: Performed by: NURSE ANESTHETIST, CERTIFIED REGISTERED

## 2023-03-23 PROCEDURE — 7100000011 HC PHASE II RECOVERY - ADDTL 15 MIN: Performed by: ORTHOPAEDIC SURGERY

## 2023-03-23 PROCEDURE — 7100000010 HC PHASE II RECOVERY - FIRST 15 MIN: Performed by: ORTHOPAEDIC SURGERY

## 2023-03-23 PROCEDURE — 6370000000 HC RX 637 (ALT 250 FOR IP): Performed by: ORTHOPAEDIC SURGERY

## 2023-03-23 PROCEDURE — 2580000003 HC RX 258: Performed by: ORTHOPAEDIC SURGERY

## 2023-03-23 PROCEDURE — 6360000002 HC RX W HCPCS: Performed by: ANESTHESIOLOGY

## 2023-03-23 PROCEDURE — 3600000004 HC SURGERY LEVEL 4 BASE: Performed by: ORTHOPAEDIC SURGERY

## 2023-03-23 PROCEDURE — 2709999900 HC NON-CHARGEABLE SUPPLY: Performed by: ORTHOPAEDIC SURGERY

## 2023-03-23 PROCEDURE — 64415 NJX AA&/STRD BRCH PLXS IMG: CPT | Performed by: ANESTHESIOLOGY

## 2023-03-23 PROCEDURE — 2720000010 HC SURG SUPPLY STERILE: Performed by: ORTHOPAEDIC SURGERY

## 2023-03-23 PROCEDURE — 2500000003 HC RX 250 WO HCPCS: Performed by: NURSE ANESTHETIST, CERTIFIED REGISTERED

## 2023-03-23 PROCEDURE — 2500000003 HC RX 250 WO HCPCS: Performed by: ANESTHESIOLOGY

## 2023-03-23 PROCEDURE — C1713 ANCHOR/SCREW BN/BN,TIS/BN: HCPCS | Performed by: ORTHOPAEDIC SURGERY

## 2023-03-23 PROCEDURE — L3809 WHFO W/O JOINTS PRE OTS: HCPCS | Performed by: ORTHOPAEDIC SURGERY

## 2023-03-23 PROCEDURE — 3600000014 HC SURGERY LEVEL 4 ADDTL 15MIN: Performed by: ORTHOPAEDIC SURGERY

## 2023-03-23 PROCEDURE — 84703 CHORIONIC GONADOTROPIN ASSAY: CPT

## 2023-03-23 PROCEDURE — 3700000001 HC ADD 15 MINUTES (ANESTHESIA): Performed by: ORTHOPAEDIC SURGERY

## 2023-03-23 PROCEDURE — 6360000002 HC RX W HCPCS

## 2023-03-23 PROCEDURE — 7100000001 HC PACU RECOVERY - ADDTL 15 MIN: Performed by: ORTHOPAEDIC SURGERY

## 2023-03-23 PROCEDURE — 6360000002 HC RX W HCPCS: Performed by: ORTHOPAEDIC SURGERY

## 2023-03-23 PROCEDURE — 3700000000 HC ANESTHESIA ATTENDED CARE: Performed by: ORTHOPAEDIC SURGERY

## 2023-03-23 DEVICE — ANCHOR SUT L24.5MM DIA5.5MM BIOCOMP VENT SELF PUNCHING: Type: IMPLANTABLE DEVICE | Site: SHOULDER | Status: FUNCTIONAL

## 2023-03-23 RX ORDER — HYDROMORPHONE HCL 110MG/55ML
0.5 PATIENT CONTROLLED ANALGESIA SYRINGE INTRAVENOUS EVERY 5 MIN PRN
Status: DISCONTINUED | OUTPATIENT
Start: 2023-03-23 | End: 2023-03-23 | Stop reason: HOSPADM

## 2023-03-23 RX ORDER — ONDANSETRON 2 MG/ML
INJECTION INTRAMUSCULAR; INTRAVENOUS PRN
Status: DISCONTINUED | OUTPATIENT
Start: 2023-03-23 | End: 2023-03-23 | Stop reason: SDUPTHER

## 2023-03-23 RX ORDER — PROCHLORPERAZINE EDISYLATE 5 MG/ML
INJECTION INTRAMUSCULAR; INTRAVENOUS
Status: COMPLETED
Start: 2023-03-23 | End: 2023-03-23

## 2023-03-23 RX ORDER — DEXAMETHASONE SODIUM PHOSPHATE 4 MG/ML
INJECTION, SOLUTION INTRA-ARTICULAR; INTRALESIONAL; INTRAMUSCULAR; INTRAVENOUS; SOFT TISSUE PRN
Status: DISCONTINUED | OUTPATIENT
Start: 2023-03-23 | End: 2023-03-23 | Stop reason: SDUPTHER

## 2023-03-23 RX ORDER — HYDROMORPHONE HCL 110MG/55ML
0.25 PATIENT CONTROLLED ANALGESIA SYRINGE INTRAVENOUS EVERY 5 MIN PRN
Status: DISCONTINUED | OUTPATIENT
Start: 2023-03-23 | End: 2023-03-23 | Stop reason: HOSPADM

## 2023-03-23 RX ORDER — LIDOCAINE HYDROCHLORIDE 20 MG/ML
INJECTION, SOLUTION EPIDURAL; INFILTRATION; INTRACAUDAL; PERINEURAL PRN
Status: DISCONTINUED | OUTPATIENT
Start: 2023-03-23 | End: 2023-03-23 | Stop reason: SDUPTHER

## 2023-03-23 RX ORDER — FENTANYL CITRATE 50 UG/ML
100 INJECTION, SOLUTION INTRAMUSCULAR; INTRAVENOUS
Status: DISCONTINUED | OUTPATIENT
Start: 2023-03-23 | End: 2023-03-23 | Stop reason: HOSPADM

## 2023-03-23 RX ORDER — KETAMINE HCL IN NACL, ISO-OSM 100MG/10ML
SYRINGE (ML) INJECTION PRN
Status: DISCONTINUED | OUTPATIENT
Start: 2023-03-23 | End: 2023-03-23 | Stop reason: SDUPTHER

## 2023-03-23 RX ORDER — SODIUM CHLORIDE 0.9 % (FLUSH) 0.9 %
5-40 SYRINGE (ML) INJECTION PRN
Status: DISCONTINUED | OUTPATIENT
Start: 2023-03-23 | End: 2023-03-23 | Stop reason: HOSPADM

## 2023-03-23 RX ORDER — GLYCOPYRROLATE 0.2 MG/ML
0.2 INJECTION INTRAMUSCULAR; INTRAVENOUS ONCE
Status: COMPLETED | OUTPATIENT
Start: 2023-03-23 | End: 2023-03-23

## 2023-03-23 RX ORDER — SODIUM CHLORIDE 0.9 % (FLUSH) 0.9 %
5-40 SYRINGE (ML) INJECTION EVERY 12 HOURS SCHEDULED
Status: DISCONTINUED | OUTPATIENT
Start: 2023-03-23 | End: 2023-03-23 | Stop reason: HOSPADM

## 2023-03-23 RX ORDER — DEXMEDETOMIDINE HYDROCHLORIDE 100 UG/ML
INJECTION, SOLUTION INTRAVENOUS PRN
Status: DISCONTINUED | OUTPATIENT
Start: 2023-03-23 | End: 2023-03-23 | Stop reason: SDUPTHER

## 2023-03-23 RX ORDER — MIDAZOLAM HYDROCHLORIDE 2 MG/2ML
2 INJECTION, SOLUTION INTRAMUSCULAR; INTRAVENOUS ONCE
Status: COMPLETED | OUTPATIENT
Start: 2023-03-23 | End: 2023-03-23

## 2023-03-23 RX ORDER — PROPOFOL 10 MG/ML
INJECTION, EMULSION INTRAVENOUS PRN
Status: DISCONTINUED | OUTPATIENT
Start: 2023-03-23 | End: 2023-03-23 | Stop reason: SDUPTHER

## 2023-03-23 RX ORDER — OXYCODONE HYDROCHLORIDE AND ACETAMINOPHEN 5; 325 MG/1; MG/1
1 TABLET ORAL EVERY 4 HOURS PRN
Qty: 40 TABLET | Refills: 0 | Status: SHIPPED | OUTPATIENT
Start: 2023-03-23 | End: 2023-03-30

## 2023-03-23 RX ORDER — POVIDONE-IODINE 10 MG/G
OINTMENT TOPICAL
Status: COMPLETED | OUTPATIENT
Start: 2023-03-23 | End: 2023-03-23

## 2023-03-23 RX ORDER — VECURONIUM BROMIDE 1 MG/ML
INJECTION, POWDER, LYOPHILIZED, FOR SOLUTION INTRAVENOUS PRN
Status: DISCONTINUED | OUTPATIENT
Start: 2023-03-23 | End: 2023-03-23 | Stop reason: SDUPTHER

## 2023-03-23 RX ORDER — SODIUM CHLORIDE, SODIUM LACTATE, POTASSIUM CHLORIDE, CALCIUM CHLORIDE 600; 310; 30; 20 MG/100ML; MG/100ML; MG/100ML; MG/100ML
INJECTION, SOLUTION INTRAVENOUS CONTINUOUS PRN
Status: DISCONTINUED | OUTPATIENT
Start: 2023-03-23 | End: 2023-03-23 | Stop reason: SDUPTHER

## 2023-03-23 RX ORDER — SODIUM CHLORIDE 9 MG/ML
INJECTION, SOLUTION INTRAVENOUS PRN
Status: DISCONTINUED | OUTPATIENT
Start: 2023-03-23 | End: 2023-03-23 | Stop reason: HOSPADM

## 2023-03-23 RX ORDER — PROCHLORPERAZINE EDISYLATE 5 MG/ML
5 INJECTION INTRAMUSCULAR; INTRAVENOUS ONCE
Status: COMPLETED | OUTPATIENT
Start: 2023-03-23 | End: 2023-03-23

## 2023-03-23 RX ORDER — PROMETHAZINE HYDROCHLORIDE 25 MG/1
25 TABLET ORAL EVERY 6 HOURS PRN
Qty: 5 TABLET | Refills: 0 | Status: SHIPPED | OUTPATIENT
Start: 2023-03-23

## 2023-03-23 RX ORDER — SUCCINYLCHOLINE/SOD CL,ISO/PF 200MG/10ML
SYRINGE (ML) INTRAVENOUS PRN
Status: DISCONTINUED | OUTPATIENT
Start: 2023-03-23 | End: 2023-03-23 | Stop reason: SDUPTHER

## 2023-03-23 RX ORDER — LIDOCAINE HYDROCHLORIDE 10 MG/ML
0.5 INJECTION, SOLUTION EPIDURAL; INFILTRATION; INTRACAUDAL; PERINEURAL ONCE
Status: DISCONTINUED | OUTPATIENT
Start: 2023-03-23 | End: 2023-03-23 | Stop reason: HOSPADM

## 2023-03-23 RX ORDER — GLYCOPYRROLATE 0.2 MG/ML
INJECTION INTRAMUSCULAR; INTRAVENOUS PRN
Status: DISCONTINUED | OUTPATIENT
Start: 2023-03-23 | End: 2023-03-23 | Stop reason: SDUPTHER

## 2023-03-23 RX ORDER — BUPIVACAINE HYDROCHLORIDE 5 MG/ML
INJECTION, SOLUTION EPIDURAL; INTRACAUDAL
Status: DISCONTINUED | OUTPATIENT
Start: 2023-03-23 | End: 2023-03-23 | Stop reason: SDUPTHER

## 2023-03-23 RX ORDER — ONDANSETRON 2 MG/ML
4 INJECTION INTRAMUSCULAR; INTRAVENOUS
Status: COMPLETED | OUTPATIENT
Start: 2023-03-23 | End: 2023-03-23

## 2023-03-23 RX ORDER — FENTANYL CITRATE 50 UG/ML
INJECTION, SOLUTION INTRAMUSCULAR; INTRAVENOUS PRN
Status: DISCONTINUED | OUTPATIENT
Start: 2023-03-23 | End: 2023-03-23 | Stop reason: SDUPTHER

## 2023-03-23 RX ORDER — SODIUM CHLORIDE, SODIUM LACTATE, POTASSIUM CHLORIDE, CALCIUM CHLORIDE 600; 310; 30; 20 MG/100ML; MG/100ML; MG/100ML; MG/100ML
INJECTION, SOLUTION INTRAVENOUS CONTINUOUS
Status: DISCONTINUED | OUTPATIENT
Start: 2023-03-23 | End: 2023-03-23 | Stop reason: HOSPADM

## 2023-03-23 RX ORDER — MAGNESIUM SULFATE HEPTAHYDRATE 500 MG/ML
INJECTION, SOLUTION INTRAMUSCULAR; INTRAVENOUS PRN
Status: DISCONTINUED | OUTPATIENT
Start: 2023-03-23 | End: 2023-03-23 | Stop reason: SDUPTHER

## 2023-03-23 RX ADMIN — SODIUM CHLORIDE, POTASSIUM CHLORIDE, SODIUM LACTATE AND CALCIUM CHLORIDE: 600; 310; 30; 20 INJECTION, SOLUTION INTRAVENOUS at 11:04

## 2023-03-23 RX ADMIN — PROCHLORPERAZINE EDISYLATE 5 MG: 5 INJECTION INTRAMUSCULAR; INTRAVENOUS at 14:44

## 2023-03-23 RX ADMIN — Medication 160 MG: at 11:10

## 2023-03-23 RX ADMIN — Medication 20 MG: at 11:57

## 2023-03-23 RX ADMIN — Medication 10 MG: at 11:26

## 2023-03-23 RX ADMIN — FENTANYL CITRATE 50 MCG: 50 INJECTION, SOLUTION INTRAMUSCULAR; INTRAVENOUS at 11:10

## 2023-03-23 RX ADMIN — PROPOFOL 150 MG: 10 INJECTION, EMULSION INTRAVENOUS at 11:10

## 2023-03-23 RX ADMIN — ONDANSETRON 4 MG: 2 INJECTION INTRAMUSCULAR; INTRAVENOUS at 11:15

## 2023-03-23 RX ADMIN — FENTANYL CITRATE 50 MCG: 50 INJECTION, SOLUTION INTRAMUSCULAR; INTRAVENOUS at 12:19

## 2023-03-23 RX ADMIN — SODIUM CHLORIDE, POTASSIUM CHLORIDE, SODIUM LACTATE AND CALCIUM CHLORIDE: 600; 310; 30; 20 INJECTION, SOLUTION INTRAVENOUS at 12:10

## 2023-03-23 RX ADMIN — ONDANSETRON 4 MG: 2 INJECTION INTRAMUSCULAR; INTRAVENOUS at 13:45

## 2023-03-23 RX ADMIN — CEFAZOLIN 2000 MG: 2 INJECTION, POWDER, FOR SOLUTION INTRAMUSCULAR; INTRAVENOUS at 11:01

## 2023-03-23 RX ADMIN — VECURONIUM BROMIDE 10 MG: 1 INJECTION, POWDER, LYOPHILIZED, FOR SOLUTION INTRAVENOUS at 11:15

## 2023-03-23 RX ADMIN — DEXAMETHASONE SODIUM PHOSPHATE 8 MG: 4 INJECTION, SOLUTION INTRAMUSCULAR; INTRAVENOUS at 11:15

## 2023-03-23 RX ADMIN — SUGAMMADEX 200 MG: 100 INJECTION, SOLUTION INTRAVENOUS at 12:01

## 2023-03-23 RX ADMIN — LIDOCAINE HYDROCHLORIDE 100 MG: 20 INJECTION, SOLUTION EPIDURAL; INFILTRATION; INTRACAUDAL; PERINEURAL at 11:10

## 2023-03-23 RX ADMIN — GLYCOPYRROLATE 0.2 MG: 0.2 INJECTION INTRAMUSCULAR; INTRAVENOUS at 11:06

## 2023-03-23 RX ADMIN — DEXMEDETOMIDINE HYDROCHLORIDE 4 MCG: 100 INJECTION, SOLUTION INTRAVENOUS at 11:42

## 2023-03-23 RX ADMIN — SODIUM CHLORIDE, POTASSIUM CHLORIDE, SODIUM LACTATE AND CALCIUM CHLORIDE: 600; 310; 30; 20 INJECTION, SOLUTION INTRAVENOUS at 10:16

## 2023-03-23 RX ADMIN — MIDAZOLAM 2 MG: 1 INJECTION INTRAMUSCULAR; INTRAVENOUS at 10:43

## 2023-03-23 RX ADMIN — MAGNESIUM SULFATE HEPTAHYDRATE 1 G: 500 INJECTION, SOLUTION INTRAMUSCULAR; INTRAVENOUS at 11:15

## 2023-03-23 RX ADMIN — FENTANYL CITRATE 100 MCG: 50 INJECTION, SOLUTION INTRAMUSCULAR; INTRAVENOUS at 10:43

## 2023-03-23 RX ADMIN — DEXMEDETOMIDINE HYDROCHLORIDE 4 MCG: 100 INJECTION, SOLUTION INTRAVENOUS at 11:36

## 2023-03-23 RX ADMIN — GLYCOPYRROLATE 0.2 MG: 0.2 INJECTION, SOLUTION INTRAMUSCULAR; INTRAVENOUS at 15:15

## 2023-03-23 RX ADMIN — DEXMEDETOMIDINE HYDROCHLORIDE 4 MCG: 100 INJECTION, SOLUTION INTRAVENOUS at 11:53

## 2023-03-23 RX ADMIN — BUPIVACAINE HYDROCHLORIDE 20 ML: 5 INJECTION EPIDURAL; INTRACAUDAL; PERINEURAL at 12:41

## 2023-03-23 ASSESSMENT — PAIN - FUNCTIONAL ASSESSMENT
PAIN_FUNCTIONAL_ASSESSMENT: 0-10
PAIN_FUNCTIONAL_ASSESSMENT: PREVENTS OR INTERFERES SOME ACTIVE ACTIVITIES AND ADLS

## 2023-03-23 ASSESSMENT — PAIN DESCRIPTION - DESCRIPTORS
DESCRIPTORS: ACHING
DESCRIPTORS: ACHING

## 2023-03-23 ASSESSMENT — PAIN DESCRIPTION - ORIENTATION: ORIENTATION: RIGHT

## 2023-03-23 ASSESSMENT — PAIN DESCRIPTION - LOCATION: LOCATION: SHOULDER

## 2023-03-23 NOTE — ANESTHESIA PROCEDURE NOTES
patient's record.             Medications Administered  bupivacaine (PF) 0.5 % - Perineural   20 mL - 3/23/2023 12:41:00 PM

## 2023-03-23 NOTE — BRIEF OP NOTE
Brief Postoperative Note      Patient: Azalia Marmolejo  YOB: 1985  MRN: [id number]    Date of Procedure: 3/23/2023    Pre-Op Diagnosis: Incomplete tear of right rotator cuff, Superior labrum anterior-to-posterior (SLAP) tear of right shoulder, Subluxation of tendon of long head of biceps    Post-Op Diagnosis: Right shoulder high grade partial / near complete superior border subscapularis tear, long head biceps tendon subluxation, labral fraying, sublabral foramen       Procedure(s):  RIGHT SHOULDER ARTHROSCOPY, BICEPS TENODESIS, ROTATOR CUFF REPAIR -       Surgeon(s):  Hermila Klein MD    Assistant:  First Assistant: Gillian Tate RN    Anesthesia: General + block    Estimated Blood Loss (mL): Minimal    Complications: None    Specimens:   * No specimens in log *    Implants:  Implant Name Type Inv. Item Serial No.  Lot No. LRB No. Used Action   ANCHOR SUT L24.5MM DIA5. 5MM BIOCOMP VENT SELF PUNCHING - [id number]  ANCHOR SUT L24.5MM DIA5. 5MM BIOCOMP VENT SELF PUNCHING  [employer]- [id number] Right 1 Implanted             Electronically signed by Hermila Klein MD on 3/23/2023 at 12:02 PM

## 2023-03-23 NOTE — ANESTHESIA POSTPROCEDURE EVALUATION
Department of Anesthesiology  Postprocedure Note    Patient: Dianah Leventhal  MRN: [id number]  YOB: 1985  Date of evaluation: 3/23/2023      Procedure Summary     Date: 03/23/23 Room / Location: [facility] OR [address] [place] [address]    Anesthesia Start: 1104 Anesthesia Stop: 1675    Procedure: RIGHT SHOULDER ARTHROSCOPY, BICEPS TENODESIS, ROTATOR CUFF REPAIR - +BLOCK; (Right: Shoulder) Diagnosis:       Incomplete tear of right rotator cuff, unspecified whether traumatic      Superior labrum anterior-to-posterior (SLAP) tear of right shoulder      Subluxation of tendon of long head of biceps      (Incomplete tear of right rotator cuff, unspecified whether traumatic [M75.111])      (Superior labrum anterior-to-posterior (SLAP) tear of right shoulder [S43.431A])      (Subluxation of tendon of long head of biceps [S43.003A])    Surgeons: Farzaneh Hernandez MD Responsible Provider: Jean Paul Potter MD    Anesthesia Type: general, regional ASA Status: 2          Anesthesia Type: No value filed.     Heydi Phase I: Heydi Score: 10    Heydi Phase II:        Anesthesia Post Evaluation    Patient location during evaluation: PACU  Patient participation: complete - patient participated  Level of consciousness: awake and alert  Pain score: 2  Airway patency: patent  Nausea & Vomiting: no vomiting  Complications: no  Cardiovascular status: blood pressure returned to baseline  Respiratory status: acceptable  Hydration status: euvolemic  Multimodal analgesia pain management approach

## 2023-03-23 NOTE — PROGRESS NOTES
Block complete. Pt tolerated fair. Post procedure VS= P=56  O2 sat= % throughout.  BP= 119/83
Cryo therapy started per order
Cryo therapy turned on per order
Did go over instructions with nikhil Gomez. Did verbalize understanding of these.
Discharge instructions review with patient and Jason. All home medications have been reviewed, pt v/u. Discharge instructions signed. Pt discharged via wheelchair. Pt discharged with her cryo  therapy machine and  all belongings. Jason taking stable pt home.
Dr Susannah Schaumann here and did order Robinol and 500 cc fluid bolus which was ordered and given.
Monitors applied. Baseline FU=308/81 P=74  O2 sat= 99% with 2Lvia NC. Time out completed per protocol prior to right interscalene block. Medicated with 2mg Versed and 100mcg Fentanyl as directed by anesthesia.
Pt arrived from OR to PACU bay 9. Reported received from [facility] [address], RN/ CRNA staff. Pt is arousable to voice. Surgical incisions dressings in place to rt shoulder. Pt on Simple Mask at 4-L, NSR, and VSS. Will continue to monitor.
Pt awake and alert at this time. Pt on RA, and VSS. Pt denies pain and nausea, tolerating PO. Skin warm and dry, palpable pulses and able to wiggle fingers. Pt meets criteria to be discharged from Phase 1.
Pt awake and alert at this time. Pt on RA, and VSS. Pt denies pain and nausea, tolerating PO. Skin warm dry, palpable pulses and able to wiggle fingers. Pt meets criteria to be discharged from Phase 1.
Remains pale and nauseated and was assisted back to the bed.  Did start iv fluid bolus
Teaching / education initiated regarding perioperative experience, expectations, and pain management during stay. Patient verbalized understanding.
Up to the bathroom with assist and in the wheel chair. Tolerated going to the  bathroom on arrival back to the bed was nauseated and pale. Michelle Roberts RN did assist her and called Dr Clinton Rubio for orders for Compazine.
and insurance card. 19.  Visit our web site for additional information:  [email or web address]              20.During flu season no children under the age of 15 are permitted in the hospital for the safety of all patients. 21. If you take a long acting insulin in the evening only  take half of your usual  dose the night  before your procedure              22. If you use a c-pap please bring DOS if staying overnight,             23.For your convenience [facility] has a pharmacy on site to fill your prescriptions. 24. If you use oxygen and have a portable tank please bring it  with you the DOS             25. Bring a complete list of all your medications with name and dose include any supplements. 26. Other__________________________________________   *Please call pre admission testing if you any further questions   [place] [phone]. Air  [phone]   [address] [place] [address]       VISITOR POLICY(subject to change)    Current policy is 2 visitors per patient. No children. Mask is  at the discretion of the facility. Visiting hours are 8a-8p. Overnight visitors will be at the discretion of the nurse. All policies subject to change. All above information reviewed with patient in person or by phone. Patient verbalizes understanding. All questions and concerns addressed.                                                                                                  Patient/Rep_patient___________________                                                                                                                                    PRE OP INSTRUCTIONS

## 2023-03-23 NOTE — ANESTHESIA PRE PROCEDURE
Surgical History:        Procedure Laterality Date    WISDOM TOOTH EXTRACTION         Social History:    Social History     Tobacco Use    Smoking status: Never    Smokeless tobacco: Never   Substance Use Topics    Alcohol use: Never                                Counseling given: Not Answered      Vital Signs (Current):   Vitals:    03/20/23 1355   Weight: 210 lb (95.3 kg)   Height: 5' 10\" (1.778 m)                                              BP Readings from Last 3 Encounters:   12/12/22 122/78   10/09/20 112/62       NPO Status:  BEFORE MN                                                                                BMI:   Wt Readings from Last 3 Encounters:   03/20/23 210 lb (95.3 kg)   02/15/23 210 lb 12.8 oz (95.6 kg)   01/11/23 212 lb (96.2 kg)     Body mass index is 30.13 kg/m². CBC:   Lab Results   Component Value Date/Time    WBC 5.1 12/14/2022 07:51 AM    RBC 4.60 12/14/2022 07:51 AM    HGB 12.7 12/14/2022 07:51 AM    HCT 39.1 12/14/2022 07:51 AM    MCV 85.0 12/14/2022 07:51 AM    RDW 14.5 12/14/2022 07:51 AM     12/14/2022 07:51 AM       CMP:   Lab Results   Component Value Date/Time     12/14/2022 07:51 AM    K 4.2 12/14/2022 07:51 AM     12/14/2022 07:51 AM    CO2 25 12/14/2022 07:51 AM    BUN 12 12/14/2022 07:51 AM    CREATININE 0.6 12/14/2022 07:51 AM    GFRAA >60 10/23/2020 10:25 AM    AGRATIO 2.1 10/23/2020 10:25 AM    LABGLOM >60 12/14/2022 07:51 AM    GLUCOSE 89 12/14/2022 07:51 AM    PROT 6.5 10/23/2020 10:25 AM    CALCIUM 9.1 12/14/2022 07:51 AM    BILITOT 0.5 10/23/2020 10:25 AM    ALKPHOS 57 10/23/2020 10:25 AM    AST 16 10/23/2020 10:25 AM    ALT 10 10/23/2020 10:25 AM     UCG PENDING   POC Tests: No results for input(s): POCGLU, POCNA, POCK, POCCL, POCBUN, POCHEMO, POCHCT in the last 72 hours.     Coags: No results found for: PROTIME, INR, APTT    HCG (If Applicable): No results found for: PREGTESTUR, PREGSERUM, HCG, HCGQUANT     ABGs: No results found for:

## 2023-03-23 NOTE — OP NOTE
SwiveLock anchor. This was placed in the lesser tuberosity and tension was placed on the sutures, reapproximating the tendon back down to the footprint. The anchor was inserted for fixation. Internal and external rotation of the humeral head now demonstrated no movement of the subscapularis tendon, demonstrated good fixation. Closure  The shoulder was drained. Arthroscopic equipment was removed and the portals closed with 3-0 Prolene. Steri-Strips, Betadine ointment, 2 x 2's, and Tegaderm dressing were applied  Shoulder immobilizer was applied. The patient was awakened, transferred onto the hospital cart and taken to the PACU in stable condition. Plan  Patient will be recovered in the PACU and then discharged home. The patient will be in a sling for 4 weeks. We will start physical therapy immediately after surgery. Patient was given a prescription for Percocet and Phenergan. They were instructed to ice the shoulder is much as possible for the next 3-4 days, with specific instructions to avoid having the ice or cooling pad directly in contact with the skin to avoid the potential for frostbite. We will follow-up in the office in the next few days for wound check. Mayudence Flatter.  Bethany Alarcon MD  Orthopaedic Surgery and Sports Medicine

## 2023-03-27 DIAGNOSIS — M75.111 INCOMPLETE TEAR OF RIGHT ROTATOR CUFF, UNSPECIFIED WHETHER TRAUMATIC: Primary | ICD-10-CM

## 2023-03-28 ENCOUNTER — TELEPHONE (OUTPATIENT)
Dept: ORTHOPEDIC SURGERY | Age: 38
End: 2023-03-28

## 2023-03-29 ENCOUNTER — OFFICE VISIT (OUTPATIENT)
Dept: ORTHOPEDIC SURGERY | Age: 38
End: 2023-03-29

## 2023-03-29 VITALS — BODY MASS INDEX: 30.06 KG/M2 | WEIGHT: 210 LBS | HEIGHT: 70 IN | RESPIRATION RATE: 16 BRPM

## 2023-03-29 DIAGNOSIS — M75.111 INCOMPLETE TEAR OF RIGHT ROTATOR CUFF, UNSPECIFIED WHETHER TRAUMATIC: Primary | ICD-10-CM

## 2023-03-29 PROCEDURE — 99024 POSTOP FOLLOW-UP VISIT: CPT | Performed by: STUDENT IN AN ORGANIZED HEALTH CARE EDUCATION/TRAINING PROGRAM

## 2023-03-29 NOTE — LETTER
March 29, 2023       Fayette Blizzard YOB: 1985   [address] [facility] Date of Visit:  3/29/2023       To Whom It May Concern: It is my medical opinion that Kane Linn may return to work on 3/30/2023. 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 If you have any questions or concerns, please don't hesitate to call.     Sincerely,      REBECCA Tong, PA-C

## 2023-03-29 NOTE — PROGRESS NOTES
or soaks. Can leave open to air or apply band-aids to the incisions once out of Tegaderm. No driving while on pain medication if it causes impairment. Do not recommend driving while shoulder is in sling as this may impair ability to control vehicle. Return to office at the 2 week postop time period for suture removal and evaluation of progress. Eleanor Singh PA-C  Board Certified by the [employer] on Certification of [address] [facility]       Disclaimer: This note was generated with use of a verbal recognition program (DRAGON) and an attempt was made to check for errors. It is possible that there are still dictated errors within this office note. If so, please bring any significant errors to my attention for an addendum. All efforts were made to ensure that this office note is accurate.

## 2023-03-29 NOTE — LETTER
Dressing Change Instructions:       Please change your dressing every other day. You will only need to change the gauze and Tegaderm each time.  You may let water run over the dressing in the shower. Do NOT submerge or soak your incision(s).  Once you are out of bandage change supplies, you may cover your incision(s) with a band-aid to keep the sutures from catching on clothing. You may let your incisions get wet in the shower; continue to refrain from submerging or soaking your incision(s).  Supplies may be trimmed to fit your bandage size. The entirety of the gauze should be covered with the Tegaderm. If it is not covered, it will hold water and keep the area moist around the incision(s) - which could cause infection or delay healing.  Do not submerge or soak your incision until cleared by the provider.

## 2023-03-30 ENCOUNTER — HOSPITAL ENCOUNTER (OUTPATIENT)
Dept: PHYSICAL THERAPY | Age: 38
Setting detail: THERAPIES SERIES
Discharge: HOME | End: 2023-03-30
Payer: COMMERCIAL

## 2023-03-30 PROCEDURE — 97110 THERAPEUTIC EXERCISES: CPT

## 2023-03-30 PROCEDURE — 97161 PT EVAL LOW COMPLEX 20 MIN: CPT

## 2023-03-30 PROCEDURE — 97112 NEUROMUSCULAR REEDUCATION: CPT

## 2023-03-30 NOTE — FLOWSHEET NOTE
tolerance  Begin active ROM  Progress to full AROM as comfort allows    Exercises/Interventions:   Therapeutic Ex (58363) Sets/sec Reps Notes/CUES HEP   Pendulum hang 2 10     PROM shoulder flexion walk out 1 10     Elbow flexion 2 10     Shrugs  2 10     pinches 2 10      2 10                                        Manual Intervention (47218)                                                 NMR re-education (73039)   CUES NEEDED                                                                   Therapeutic Activity (00546)                                          jaeyos access code: [email or web address]           Therapeutic Exercise and NMR EXR  [x] (63613) Provided verbal/tactile cueing for activities related to strengthening, flexibility, endurance, ROM  for improvements in scapular, scapulothoracic and UE control with self care, reaching, carrying, lifting, house/yardwork, driving/computer work. [x] (67607) Provided verbal/tactile cueing for activities related to improving balance, coordination, kinesthetic sense, posture, motor skill, proprioception  to assist with  scapular, scapulothoracic and UE control with self care, reaching, carrying, lifting, house/yardwork, driving/computer work. Therapeutic Activities:    [x] (89227 or 10893) Provided verbal/tactile cueing for activities related to improving balance, coordination, kinesthetic sense, posture, motor skill, proprioception and motor activation to allow for proper function of scapular, scapulothoracic and UE control with self care, carrying, lifting, driving/computer work.      Home Exercise Program:    [x] (05495) Reviewed/Progressed HEP activities related to strengthening, flexibility, endurance, ROM of scapular, scapulothoracic and UE control with self care, reaching, carrying, lifting, house/yardwork, driving/computer work  [] (75165) Reviewed/Progressed HEP activities related to improving balance, coordination, kinesthetic sense, posture, motor skill,

## 2023-03-30 NOTE — PROGRESS NOTES
[address] [place] [address] and Sports Rehabilitation, [address], [address] [place] [address] [address]  Phone: [phone]   Fax: [phone]    Date: 3/30/2023          Patient Name; :  Garett Quant; 1985   Dx: Diagnosis: M75.111 (ICD-10-CM) - Incomplete tear of right rotator cuff, unspecified whether traumatic      Physician:  Dr. Luis Fang         Total PT Visits:      Measures Previous Current   Pain (0-10)     Disability %     ROM               Strength                 Specific Functional Improvements & Impressions:      Plan & Recommendations:  [] Continue rehabilitation due to objective improvement and continued functional deficits with frequency and duration:  [] Progress toward  []GAP, []Work Conditioning, []Independent HEP   [] Discharge due to   [] All goals achieved, [] Maximized \"medical necessity\" [] No subjective or objective improvements      Electronically signed by:  Linnea Farrar, PT  Therapy Plan of Care Re-Certification  This patient has been re-evaluated for physical therapy services and for therapy to continue, Medicare, Medicaid and other insurances require periodic physician review of the treatment plan. Please review the above re-evaluation and verify that you agree with plan of care as established above by signing the attached document and return it to our office or note changes to established plan below  [] Follow treatment plan as above [] Discontinue physical therapy  [] Change plan to:                                 __________________________________________________    Physician Signature:____________________________________ Date:____________  By signing above, therapists plan is approved by physician    If you have any questions or concerns, please don't hesitate to call.   Thank you for your referral.

## 2023-03-30 NOTE — PLAN OF CARE
conditions   []Disc pathology   []Congenital spine pathologies   []Prior surgical intervention  []Osteoporosis (M81.8)  []Osteopenia (M85.8)   Endocrine conditions   []Hypothyroid (E03.9)  []Hyperthyroid Gastrointestinal conditions   []Constipation (P12.45)   Metabolic conditions   []Morbid obesity (E66.01)  []Diabetes type 1(E10.65) or 2 (E11.65)   []Neuropathy (G60.9)     Pulmonary conditions   []Asthma (J45)  []Coughing   []COPD (J44.9)   Psychological Disorders  []Anxiety (F41.9)  []Depression (F32.9)   []Other:   []Other:          Barriers to/and or personal factors that will affect rehab potential:              []Age  []Sex              []Motivation/Lack of Motivation                        []Co-Morbidities              []Cognitive Function, education/learning barriers              []Environmental, home barriers              []profession/work barriers  []past PT/medical experience  []other:  Justification:      Falls Risk Assessment (30 days):   [x] Falls Risk assessed and no intervention required.   [] Falls Risk assessed and Patient requires intervention due to being higher risk   TUG score (>12s at risk):     [] Falls education provided, including       G-Codes:       ASSESSMENT:   Functional Impairments   []Noted spinal or UE joint hypomobility   []Noted spinal or UE joint hypermobility   [x]Decreased UE functional ROM   [x]Decreased UE functional strength   []Abnormal reflexes/sensation/myotomal/dermatomal deficits   [x]Decreased RC/scapular/core strength and neuromuscular control   []other:      Functional Activity Limitations (from functional questionnaire and intake)   [x]Reduced ability to tolerate prolonged functional positions   [x]Reduced ability or difficulty with changes of positions or transfers between positions   [x]Reduced ability to maintain good posture and demonstrate good body mechanics with sitting, bending, and lifting   [x] Reduced ability or tolerance with driving and/or computer

## 2023-04-06 ENCOUNTER — HOSPITAL ENCOUNTER (OUTPATIENT)
Dept: PHYSICAL THERAPY | Age: 38
Setting detail: THERAPIES SERIES
Discharge: HOME | End: 2023-04-06
Payer: COMMERCIAL

## 2023-04-06 PROCEDURE — 97140 MANUAL THERAPY 1/> REGIONS: CPT | Performed by: SPECIALIST/TECHNOLOGIST

## 2023-04-06 PROCEDURE — 97112 NEUROMUSCULAR REEDUCATION: CPT | Performed by: SPECIALIST/TECHNOLOGIST

## 2023-04-06 PROCEDURE — 97110 THERAPEUTIC EXERCISES: CPT | Performed by: SPECIALIST/TECHNOLOGIST

## 2023-04-06 PROCEDURE — 97016 VASOPNEUMATIC DEVICE THERAPY: CPT | Performed by: SPECIALIST/TECHNOLOGIST

## 2023-04-06 NOTE — FLOWSHEET NOTE
[facility] [facility] and [facility], [address] [place] [address], [address] [place] [address] [address]  Phone: [phone]   Fax:     [phone]      Physical Therapy Treatment Note/ Progress Report:     Date:  2023    Patient Name:  Alonzo Magdaleno    :  1985  MRN: [id number]  Restrictions/Precautions:    Medical/Treatment Diagnosis Information:  Diagnosis: M75.111 (ICD-10-CM) - Incomplete tear of right rotator cuff, unspecified whether traumatic  Treatment Diagnosis: R shoulder, R RCR medium tear DOS 32  Insurance/Certification information:  PT Insurance Information: BCBS $0CP NO AUTH 60 visits  Physician Information:   Dr. Jack Contreras   Has the plan of care been signed (Y/N):        []  Yes  [x]  No     Date of Patient follow up with Physician: TBD    Is this a Progress Report:     []  Yes  [x]  No     If Yes:  Date Range for reporting period:  Beginning: 3/30/23 ------------ Endin23    Progress report will be due (10 Rx or 30 days whichever is less):      Recertification will be due (POC Duration  / 90 days whichever is less): 23      Visit # Insurance Allowable Auth Required   In Person 2 60 []  Yes     [x]  No    Tele Health   []  Yes     []  No    Total 2       FOTO Score: QD 40/55     Date assessed:  3/30/23      Latex Allergy:  [x]NO      []YES  Preferred Language for Healthcare:   [x]English       []other:    Pain level:  3/10     SUBJECTIVE:      OBJECTIVE: See eval  Observation:   Test measurements:      RESTRICTIONS/PRECAUTIONS: SUBSCAP REPAIR Sling (except during PT and washing):  Medium Tear: 4 weeks at all times, then 2 weeks for activities     Motion:  POD 1:   Pendulum exercises  Elbow/wrist/hand ROM  POD 14:   Supine PROM: flex to 130, abd to 40, ER to tolerance w/ 45 degree abd.    Start stationary bike   POD 21:   P/AAROM: flex to 150, abd to 70, ER to tolerance w/ 45 degree abd  POD 42:   P/AAROM: flex to 180, abd and ER to

## 2023-04-06 NOTE — FLOWSHEET NOTE
[address] [facility] and Sports Rehabilitation, [address] [place] [address], [address] [place] [address] [address]  Phone: [phone]   Fax:     [phone]      Physical Therapy Treatment Note/ Progress Report:     Date:  2023    Patient Name:  Felipe Clements    :  1985  MRN: [id number]  Restrictions/Precautions:    Medical/Treatment Diagnosis Information:  Diagnosis: M75.111 (ICD-10-CM) - Incomplete tear of right rotator cuff, unspecified whether traumatic  Treatment Diagnosis: R shoulder, R RCR medium tear DOS   Insurance/Certification information:  PT Insurance Information: BCBS $0CP NO AUTH 60 visits  Physician Information:   Dr. Yoan Mckinney   Has the plan of care been signed (Y/N):        []  Yes  [x]  No     Date of Patient follow up with Physician: CATYD    Is this a Progress Report:     []  Yes  [x]  No     If Yes:  Date Range for reporting period:  Beginning: 3/30/23 ------------ Endin23    Progress report will be due (10 Rx or 30 days whichever is less):      Recertification will be due (POC Duration  / 90 days whichever is less): 23      Visit # Insurance Allowable Auth Required   In Person 2 60 []  Yes     [x]  No    Tele Health   []  Yes     []  No    Total 2       FOTO Score: QD 40/55     Date assessed:  3/30/23      Latex Allergy:  [x]NO      []YES  Preferred Language for Healthcare:   [x]English       []other:    Pain level:  3/10     SUBJECTIVE:     OBJECTIVE:   Observation:   Test measurements:      RESTRICTIONS/PRECAUTIONS: SUBSCAP REPAIR Sling (except during PT and washing):  Medium Tear: 4 weeks at all times, then 2 weeks for activities     Motion:  POD 1:   Pendulum exercises  Elbow/wrist/hand ROM  POD 14:   Supine PROM: flex to 130, abd to 40, ER to tolerance w/ 45 degree abd.    Start stationary bike   POD 21:   P/AAROM: flex to 150, abd to 70, ER to tolerance w/ 45 degree abd  POD 42:   P/AAROM: flex to 180, abd and ER to tolerance  Begin

## 2023-04-21 ENCOUNTER — HOSPITAL ENCOUNTER (OUTPATIENT)
Dept: PHYSICAL THERAPY | Age: 38
Setting detail: THERAPIES SERIES
Discharge: HOME | End: 2023-04-21
Payer: COMMERCIAL

## 2023-04-21 PROCEDURE — 97110 THERAPEUTIC EXERCISES: CPT

## 2023-04-21 PROCEDURE — 97140 MANUAL THERAPY 1/> REGIONS: CPT

## 2023-04-21 PROCEDURE — 97112 NEUROMUSCULAR REEDUCATION: CPT

## 2023-04-21 NOTE — FLOWSHEET NOTE
per plan of care [] Alter current plan (see comments above)  [x] Plan of care initiated [] Hold pending MD visit [] Discharge    Electronically signed by:  Vincent Moya, PT, MHI, ATC    Note: If patient does not return for scheduled/ recommended follow up visits, this note will serve as a discharge from care along with most recent update on progress.

## 2023-04-25 ENCOUNTER — HOSPITAL ENCOUNTER (OUTPATIENT)
Dept: PHYSICAL THERAPY | Age: 38
Setting detail: THERAPIES SERIES
Discharge: HOME | End: 2023-04-25
Payer: COMMERCIAL

## 2023-04-25 PROCEDURE — 97140 MANUAL THERAPY 1/> REGIONS: CPT

## 2023-04-25 PROCEDURE — 97112 NEUROMUSCULAR REEDUCATION: CPT

## 2023-04-25 PROCEDURE — 97110 THERAPEUTIC EXERCISES: CPT

## 2023-04-25 NOTE — PROGRESS NOTES
[address] [facility] and [facility], [address], [address] [place] [address] [address]  Phone: [phone]   Fax: [phone]    Date: 2023          Patient Name; :  Tashi Kimble; 1985   Dx: Diagnosis: M75.111 (ICD-10-CM) - Incomplete tear of right rotator cuff, unspecified whether traumatic      Physician:  Dr. Flaquita Dickinson         Total PT Visits: 5     Measures Previous Current   Pain (0-10) 3 0   Disability % 40/ 32/     Specific Functional Improvements & Impressions: Pt is making good progress towards therapy goals at this time. Pt is making good progress towards ROM goals. Pt has been compliant with sling usage. Plan & Recommendations:  [x] Continue rehabilitation due to objective improvement and continued functional deficits with frequency and duration:  [x] Progress toward  []GAP, []Work Conditioning, [x]Independent HEP   [] Discharge due to   [] All goals achieved, [] Maximized \"medical necessity\" [] No subjective or objective improvements      Electronically signed by:  Fabian Jaquez, PT  Therapy Plan of Care Re-Certification  This patient has been re-evaluated for physical therapy services and for therapy to continue, Medicare, Medicaid and other insurances require periodic physician review of the treatment plan. Please review the above re-evaluation and verify that you agree with plan of care as established above by signing the attached document and return it to our office or note changes to established plan below  [] Follow treatment plan as above [] Discontinue physical therapy  [] Change plan to:                                 __________________________________________________    Physician Signature:____________________________________ Date:____________  By signing above, therapists plan is approved by physician    If you have any questions or concerns, please don't hesitate to call.   Thank you for your referral.

## 2023-04-25 NOTE — FLOWSHEET NOTE
723 [facility] and [facility], [address] [place] [address], [address] [place] [address] [address]  Phone: [phone]   Fax:     [phone]      Physical Therapy Treatment Note/ Progress Report:     Date:  2023    Patient Name:  Lilli Chong    :  1985  MRN: [id number]  Restrictions/Precautions:    Medical/Treatment Diagnosis Information:  Diagnosis: M75.111 (ICD-10-CM) - Incomplete tear of right rotator cuff, unspecified whether traumatic  Treatment Diagnosis: R shoulder, R RCR medium tear DOS   Insurance/Certification information:  PT Insurance Information: BCBS $0CP NO AUTH 60 visits  Physician Information:   Dr. Burton Colón   Has the plan of care been signed (Y/N):        []  Yes  [x]  No     Date of Patient follow up with Physician: TBD    Is this a Progress Report:     []  Yes  [x]  No     If Yes:  Date Range for reporting period:  Beginning: 3/30/23 ------------ Endin23    Progress report will be due (10 Rx or 30 days whichever is less):      Recertification will be due (POC Duration  / 90 days whichever is less): 23      Visit # Insurance Allowable Auth Required   In Person 5 60 []  Yes     [x]  No    Tele Health   []  Yes     []  No    Total 5       FOTO Score: QD 40/55, 32/55     Date assessed:  3/30/23      Latex Allergy:  [x]NO      []YES  Preferred Language for Healthcare:   [x]English       []other:    Pain level:  0/10     SUBJECTIVE:  Pt reports her shoulder is doing well except for sleeping. OBJECTIVE:   Observation:   Test measurements: The pt is 4 weeks post-op on 23. RESTRICTIONS/PRECAUTIONS: SUBSCAP REPAIR Sling (except during PT and washing):  Medium Tear: 4 weeks at all times, then 2 weeks for activities     Motion:  POD 1:   Pendulum exercises  Elbow/wrist/hand ROM  POD 14:   Supine PROM: flex to 130, abd to 40, ER to tolerance w/ 45 degree abd.    Start stationary bike   POD 21:   P/AAROM: flex to

## 2023-05-04 ENCOUNTER — PATIENT MESSAGE (OUTPATIENT)
Dept: FAMILY MEDICINE CLINIC | Age: 38
End: 2023-05-04

## 2023-05-04 DIAGNOSIS — Z12.4 SCREENING FOR CERVICAL CANCER: Primary | ICD-10-CM

## 2023-05-05 ENCOUNTER — HOSPITAL ENCOUNTER (OUTPATIENT)
Dept: PHYSICAL THERAPY | Age: 38
Setting detail: THERAPIES SERIES
Discharge: HOME | End: 2023-05-05
Payer: COMMERCIAL

## 2023-05-05 PROCEDURE — 97140 MANUAL THERAPY 1/> REGIONS: CPT

## 2023-05-05 PROCEDURE — 97112 NEUROMUSCULAR REEDUCATION: CPT

## 2023-05-05 PROCEDURE — 97110 THERAPEUTIC EXERCISES: CPT

## 2023-05-05 NOTE — FLOWSHEET NOTE
723 [facility], [address] [place] [address], [address] [place] [address] [address]  Phone: [phone]   Fax:     [phone]      Physical Therapy Treatment Note/ Progress Report:     Date:  2023    Patient Name:  Myles Vanegas    :  1985  MRN: [id number]  Restrictions/Precautions:    Medical/Treatment Diagnosis Information:  Diagnosis: M75.111 (ICD-10-CM) - Incomplete tear of right rotator cuff, unspecified whether traumatic  Treatment Diagnosis: R shoulder, R RCR medium tear DOS   Insurance/Certification information:  PT Insurance Information: BCBS $0CP NO AUTH 60 visits  Physician Information:   Dr. Jean Marie Prasad   Has the plan of care been signed (Y/N):        []  Yes  [x]  No     Date of Patient follow up with Physician: TBD    Is this a Progress Report:     []  Yes  [x]  No     If Yes:  Date Range for reporting period:  Beginnin23 ------------ Endin23    Progress report will be due (10 Rx or 30 days whichever is less): 40     Recertification will be due (POC Duration  / 90 days whichever is less): 23      Visit # Insurance Allowable Auth Required   In Person 6 60 []  Yes     [x]  No    Tele Health   []  Yes     []  No    Total 6       FOTO Score: QD 40/55, 32/55     Date assessed:  3/30/23      Latex Allergy:  [x]NO      []YES  Preferred Language for Healthcare:   [x]English       []other:    Pain level:  0/10     SUBJECTIVE:  Pt reports her shoulder is sore after picking up her 1year old without thinking. OBJECTIVE:   Observation:   Test measurements: The pt is 4 weeks post-op on 23. RESTRICTIONS/PRECAUTIONS: SUBSCAP REPAIR Sling (except during PT and washing):  Medium Tear: 4 weeks at all times, then 2 weeks for activities     Motion:  POD 1:   Pendulum exercises  Elbow/wrist/hand ROM  POD 14:   Supine PROM: flex to 130, abd to 40, ER to tolerance w/ 45 degree abd.    Start stationary bike   POD

## 2023-05-08 NOTE — TELEPHONE ENCOUNTER
Hello,    I have been referring people to Dr Mary Pratt the Dermatologist, unfortunately, I heard that she may not be staying long term, I am happy to place a referral to wherever you would like, including an external referral if that would be preferrable. For gyn, I have been sending to Dr. Fernando Rodriguez. She is great and I have heard that patients really like her. I have placed that order, and it should be in their system.

## 2023-05-10 ENCOUNTER — OFFICE VISIT (OUTPATIENT)
Dept: ORTHOPEDIC SURGERY | Age: 38
End: 2023-05-10

## 2023-05-10 VITALS — WEIGHT: 212 LBS | HEIGHT: 70 IN | BODY MASS INDEX: 30.35 KG/M2 | RESPIRATION RATE: 16 BRPM

## 2023-05-10 DIAGNOSIS — M75.111 INCOMPLETE TEAR OF RIGHT ROTATOR CUFF, UNSPECIFIED WHETHER TRAUMATIC: Primary | ICD-10-CM

## 2023-05-10 PROCEDURE — 99024 POSTOP FOLLOW-UP VISIT: CPT | Performed by: STUDENT IN AN ORGANIZED HEALTH CARE EDUCATION/TRAINING PROGRAM

## 2023-05-10 NOTE — PROGRESS NOTES
Shoulder Arthroscopy Follow-up  Tre Giron is here for follow up after right shoulder arthroscopic surgery. Surgery date was 3/23/23. Findings at surgery: Right shoulder high-grade partial/near complete upper border subscapularis tendon tear, long head biceps tendon subluxation, labral fraying, sublabral foramen. Pain is controlled with current analgesics. Medication(s) being used: none. The patient's pain is rated at 0/10. She has been in a sling and non-weightbearing as instructed. She has been progressing well with PT at the [facility] office. Physical Examination:  Resp 16   Ht 5' 10\" (1.778 m)   Wt 212 lb (96.2 kg)   BMI 30.42 kg/m²    Patient is awake, alert, and in no acute distress. The incisions are healed. AROM: , ER 80, IR L5 (feels tightness with IR)      Assessment:   6 weeks status post right shoulder arthroscopy, rotator cuff repair, biceps tenotomy      Plan:   Continue progressing with PT. The patient may advance their weight bearing. She weaned from the sling 2 weeks ago. Judicious use of NSAID's. Ice as needed. Follow up in 2 months for evaluation of progress. Jannie Mathur PA-C  Board Certified by the [employer] on Certification of [address] and [address]: This note was generated with use of a verbal recognition program (DRAGON) and an attempt was made to check for errors. It is possible that there are still dictated errors within this office note. If so, please bring any significant errors to my attention for an addendum. All efforts were made to ensure that this office note is accurate.

## 2023-05-12 ENCOUNTER — HOSPITAL ENCOUNTER (OUTPATIENT)
Dept: PHYSICAL THERAPY | Age: 38
Setting detail: THERAPIES SERIES
Discharge: HOME | End: 2023-05-12
Payer: COMMERCIAL

## 2023-05-12 PROCEDURE — 97112 NEUROMUSCULAR REEDUCATION: CPT

## 2023-05-12 PROCEDURE — 97140 MANUAL THERAPY 1/> REGIONS: CPT

## 2023-05-12 PROCEDURE — 97110 THERAPEUTIC EXERCISES: CPT

## 2023-05-12 NOTE — FLOWSHEET NOTE
[facility], [address] [place] [address], [address] [place] [address] [address]  Phone: [phone]   Fax:     [phone]      Physical Therapy Treatment Note/ Progress Report:     Date:  2023    Patient Name:  Luis Felipe Mejia    :  1985  MRN: [id number]  Restrictions/Precautions:    Medical/Treatment Diagnosis Information:  Diagnosis: M75.111 (ICD-10-CM) - Incomplete tear of right rotator cuff, unspecified whether traumatic  Treatment Diagnosis: R shoulder, R RCR medium tear DOS   Insurance/Certification information:  PT Insurance Information: BCBS $0CP NO AUTH 60 visits  Physician Information:   Dr. Prisca Thayer   Has the plan of care been signed (Y/N):        []  Yes  [x]  No     Date of Patient follow up with Physician: TBD    Is this a Progress Report:     []  Yes  [x]  No     If Yes:  Date Range for reporting period:  Beginnin23 ------------ Endin23    Progress report will be due (10 Rx or 30 days whichever is less): 3/06/73     Recertification will be due (POC Duration  / 90 days whichever is less): 23      Visit # Insurance Allowable Auth Required   In Person 7 60 []  Yes     [x]  No    Tele Health   []  Yes     []  No    Total 7       FOTO Score: QD 40/55, 32/55     Date assessed:  3/30/23      Latex Allergy:  [x]NO      []YES  Preferred Language for Healthcare:   [x]English       []other:    Pain level:  0/10     SUBJECTIVE:  Pt reports her shoulder is doing well. OBJECTIVE:   Observation:   Test measurements: The pt is 8 weeks post-op on 23. RESTRICTIONS/PRECAUTIONS: SUBSCAP REPAIR Sling (except during PT and washing):  Medium Tear: 4 weeks at all times, then 2 weeks for activities     Motion:  POD 1:   Pendulum exercises  Elbow/wrist/hand ROM  POD 14:   Supine PROM: flex to 130, abd to 40, ER to tolerance w/ 45 degree abd.    Start stationary bike   POD 21:   P/AAROM: flex to 150, abd to 70, ER

## 2023-05-19 ENCOUNTER — HOSPITAL ENCOUNTER (OUTPATIENT)
Dept: PHYSICAL THERAPY | Age: 38
Setting detail: THERAPIES SERIES
Discharge: HOME | End: 2023-05-19
Payer: COMMERCIAL

## 2023-05-19 PROCEDURE — 97112 NEUROMUSCULAR REEDUCATION: CPT

## 2023-05-19 PROCEDURE — 97140 MANUAL THERAPY 1/> REGIONS: CPT

## 2023-05-19 PROCEDURE — 97110 THERAPEUTIC EXERCISES: CPT

## 2023-05-19 NOTE — FLOWSHEET NOTE
723 [facility], [address] [place] [address], [address] [place] [address] [address]  Phone: [phone]   Fax:     [phone]      Physical Therapy Treatment Note/ Progress Report:     Date:  2023    Patient Name:  Александр Lerma    :  1985  MRN: [id number]  Restrictions/Precautions:    Medical/Treatment Diagnosis Information:  Diagnosis: M75.111 (ICD-10-CM) - Incomplete tear of right rotator cuff, unspecified whether traumatic  Treatment Diagnosis: R shoulder, R RCR medium tear DOS   Insurance/Certification information:  PT Insurance Information: BCBS $0CP NO AUTH 60 visits  Physician Information:   Dr. Kalyn Farah   Has the plan of care been signed (Y/N):        []  Yes  [x]  No     Date of Patient follow up with Physician: TBD    Is this a Progress Report:     []  Yes  [x]  No     If Yes:  Date Range for reporting period:  Beginnin23 ------------ Endin23    Progress report will be due (10 Rx or 30 days whichever is less):      Recertification will be due (POC Duration  / 90 days whichever is less): 23      Visit # Insurance Allowable Auth Required   In Person 8 60 []  Yes     [x]  No    Tele Health   []  Yes     []  No    Total 8       FOTO Score: QD 40/55, 32/55     Date assessed:  3/30/23      Latex Allergy:  [x]NO      []YES  Preferred Language for Healthcare:   [x]English       []other:    Pain level:  0/10     SUBJECTIVE:  Pt reports her shoulder continues to improve. OBJECTIVE:   Observation:   Test measurements: The pt is 8 weeks post-op on 23. RESTRICTIONS/PRECAUTIONS: SUBSCAP REPAIR Sling (except during PT and washing):  Medium Tear: 4 weeks at all times, then 2 weeks for activities     Motion:  POD 1:   Pendulum exercises  Elbow/wrist/hand ROM  POD 14:   Supine PROM: flex to 130, abd to 40, ER to tolerance w/ 45 degree abd.    Start stationary bike   POD 21:   P/AAROM: flex to 150, abd to

## 2023-05-26 ENCOUNTER — HOSPITAL ENCOUNTER (OUTPATIENT)
Dept: PHYSICAL THERAPY | Age: 38
Setting detail: THERAPIES SERIES
Discharge: HOME | End: 2023-05-26
Payer: COMMERCIAL

## 2023-05-26 PROCEDURE — 97112 NEUROMUSCULAR REEDUCATION: CPT

## 2023-05-26 PROCEDURE — 97110 THERAPEUTIC EXERCISES: CPT

## 2023-05-26 NOTE — PROGRESS NOTES
[address] [place] [address] [facility], [address], [address] [place] [address] [address]  Phone: [phone]   Fax: [phone]    Date: 2023          Patient Name; :  Alber Chavez; 1985   Dx: Diagnosis: M75.111 (ICD-10-CM) - Incomplete tear of right rotator cuff, unspecified whether traumatic      Physician:  Dr. Oren Ortiz         Total PT Visits: 8     Measures Previous Current   Pain (0-10) 0 0   Disability %      Specific Functional Improvements & Impressions: Pt is making good progress towards therapy goals at this time. Pt is only lacking internal shoulder rotation at this time. Pt is showing good strength gains. Plan & Recommendations:  [x] Continue rehabilitation due to objective improvement and continued functional deficits with frequency and duration:  [x] Progress toward  []GAP, []Work Conditioning, [x]Independent HEP   [] Discharge due to   [] All goals achieved, [] Maximized \"medical necessity\" [] No subjective or objective improvements      Electronically signed by:  Dwayne Araiza, PT  Therapy Plan of Care Re-Certification  This patient has been re-evaluated for physical therapy services and for therapy to continue, Medicare, Medicaid and other insurances require periodic physician review of the treatment plan. Please review the above re-evaluation and verify that you agree with plan of care as established above by signing the attached document and return it to our office or note changes to established plan below  [] Follow treatment plan as above [] Discontinue physical therapy  [] Change plan to:                                 __________________________________________________    Physician Signature:____________________________________ Date:____________  By signing above, therapists plan is approved by physician    If you have any questions or concerns, please don't hesitate to call.   Thank you for your referral.

## 2023-05-26 NOTE — FLOWSHEET NOTE
Progressing: [] Met: [] Not Met: [] Adjusted     2. Patient will have a decrease in pain to facilitate improvement in movement, function, and ADLs as indicated by Functional Deficits. [x] Progressing: [] Met: [] Not Met: [] Adjusted     Long Term Goals: To be achieved in: 12 weeks  1. FOTO score will match or exceed predicted score to assist with reaching prior level of function. [x] Progressing: [] Met: [] Not Met: [] Adjusted     2. Patient will demonstrate increased AROM to R shoulder = to L shoulder to allow for proper joint functioning as indicated by patients Functional Deficits. [x] Progressing: [] Met: [] Not Met: [] Adjusted     3. Patient will demonstrate an increase in Strength to 5/5 in all planes of R shoulder to allow for proper functional mobility as indicated by patients Functional Deficits. [x] Progressing: [] Met: [] Not Met: [] Adjusted     4. Patient will return to all functional activities without increased symptoms or restriction. [x] Progressing: [] Met: [] Not Met: [] Adjusted     5. Pt will demonstrate the ability to perform all ADL's with 0/10 pain. (patient specific functional goal)    [x] Progressing: [] Met: [] Not Met: [] Adjusted          Overall Progression Towards Functional goals/ Treatment Progress Update:  [] Patient is progressing as expected towards functional goals listed. [] Progression is slowed due to complexities/Impairments listed. [] Progression has been slowed due to co-morbidities.   [x] Plan just implemented, too soon to assess goals progression <30days   [] Goals require adjustment due to lack of progress  [] Patient is not progressing as expected and requires additional follow up with physician  [] Other    Prognosis for POC: [x] Good [] Fair  [] Poor      Patient requires continued skilled intervention: [x] Yes  [] No    Treatment/Activity Tolerance:  [x] Patient able to complete treatment  [] Patient limited by fatigue  [] Patient limited by pain    []

## 2023-06-02 ENCOUNTER — HOSPITAL ENCOUNTER (OUTPATIENT)
Dept: PHYSICAL THERAPY | Age: 38
Setting detail: THERAPIES SERIES
Discharge: HOME | End: 2023-06-02
Payer: COMMERCIAL

## 2023-06-02 PROCEDURE — 97112 NEUROMUSCULAR REEDUCATION: CPT

## 2023-06-02 PROCEDURE — 97110 THERAPEUTIC EXERCISES: CPT

## 2023-06-02 NOTE — FLOWSHEET NOTE
[facility]23 [facility], [address] [place] [address], [address] [place] [address] [address]  Phone: [phone]   Fax:     [phone]      Physical Therapy Treatment Note/ Progress Report:     Date:  2023    Patient Name:  Baudilio Diego    :  1985  MRN: [id number]  Restrictions/Precautions:    Medical/Treatment Diagnosis Information:  Diagnosis: M75.111 (ICD-10-CM) - Incomplete tear of right rotator cuff, unspecified whether traumatic  Treatment Diagnosis: R shoulder, R RCR medium tear DOS   Insurance/Certification information:  PT Insurance Information: BCBS $0CP NO AUTH 60 visits  Physician Information:   Dr. Saenz Dec   Has the plan of care been signed (Y/N):        []  Yes  [x]  No     Date of Patient follow up with Physician: CATYD    Is this a Progress Report:     []  Yes  [x]  No     If Yes:  Date Range for reporting period:  Beginnin23 ------------ Endin23    Progress report will be due (10 Rx or 30 days whichever is less):      Recertification will be due (POC Duration  / 90 days whichever is less): 23      Visit # Insurance Allowable Auth Required   In Person 9 60 []  Yes     [x]  No    Tele Health   []  Yes     []  No    Total 9       FOTO Score: QD 40/55, 32/55     Date assessed:  3/30/23      Latex Allergy:  [x]NO      []YES  Preferred Language for Healthcare:   [x]English       []other:    Pain level:  0/10     SUBJECTIVE:  Pt reports her shoulder is doing well. OBJECTIVE:   Observation:   Test measurements: The pt is 8 weeks post-op on 23. RESTRICTIONS/PRECAUTIONS: SUBSCAP REPAIR Sling (except during PT and washing):  Medium Tear: 4 weeks at all times, then 2 weeks for activities     Motion:  POD 1:   Pendulum exercises  Elbow/wrist/hand ROM  POD 14:   Supine PROM: flex to 130, abd to 40, ER to tolerance w/ 45 degree abd.    Start stationary bike   POD 21:   P/AAROM: flex to 150, abd to 70,

## 2023-06-08 ENCOUNTER — OFFICE VISIT (OUTPATIENT)
Dept: OBGYN CLINIC | Age: 38
End: 2023-06-08
Payer: COMMERCIAL

## 2023-06-08 VITALS
HEART RATE: 76 BPM | SYSTOLIC BLOOD PRESSURE: 122 MMHG | TEMPERATURE: 98.1 F | HEIGHT: 70 IN | BODY MASS INDEX: 30.61 KG/M2 | WEIGHT: 213.8 LBS | DIASTOLIC BLOOD PRESSURE: 66 MMHG | OXYGEN SATURATION: 98 %

## 2023-06-08 DIAGNOSIS — N89.8 VAGINAL DISCHARGE: ICD-10-CM

## 2023-06-08 DIAGNOSIS — Z12.4 SCREENING FOR CERVICAL CANCER: ICD-10-CM

## 2023-06-08 DIAGNOSIS — Z01.419 WOMEN'S ANNUAL ROUTINE GYNECOLOGICAL EXAMINATION: Primary | ICD-10-CM

## 2023-06-08 PROCEDURE — 99385 PREV VISIT NEW AGE 18-39: CPT | Performed by: OBSTETRICS & GYNECOLOGY

## 2023-06-08 ASSESSMENT — ENCOUNTER SYMPTOMS
DIARRHEA: 0
BLOOD IN STOOL: 0
CONSTIPATION: 0
BACK PAIN: 0

## 2023-06-09 ENCOUNTER — HOSPITAL ENCOUNTER (OUTPATIENT)
Dept: PHYSICAL THERAPY | Age: 38
Setting detail: THERAPIES SERIES
Discharge: HOME | End: 2023-06-09
Payer: COMMERCIAL

## 2023-06-09 LAB
CANDIDA DNA VAG QL NAA+PROBE: NORMAL
G VAGINALIS DNA SPEC QL NAA+PROBE: NORMAL
HPV HR 12 DNA SPEC QL NAA+PROBE: NOT DETECTED
HPV16 DNA SPEC QL NAA+PROBE: NOT DETECTED
HPV16+18+H RISK 12 DNA SPEC-IMP: NORMAL
HPV18 DNA SPEC QL NAA+PROBE: NOT DETECTED
T VAGINALIS DNA VAG QL NAA+PROBE: NORMAL

## 2023-06-09 PROCEDURE — 97112 NEUROMUSCULAR REEDUCATION: CPT

## 2023-06-09 PROCEDURE — 97110 THERAPEUTIC EXERCISES: CPT

## 2023-06-09 NOTE — FLOWSHEET NOTE
70, ER to tolerance w/ 45 degree abd  POD 42:   P/AAROM: flex to 180, abd and ER to tolerance  Begin active ROM  Progress to full AROM as comfort allows    Exercises/Interventions:   Therapeutic Ex (33194) Sets/sec Reps Notes/CUES HEP   pulleys 5'              Elbow flexion 3 10 6#                                    Wall slides 2 10     Standing cane flexion 2 10     TB ext/row 2 15 RTB latex free    TB ER/IR 3 10 GTB        Pec S 10'' 3     Towel S IR 10'' 3     Supine concentric circles 2 10 2#    SA punches 2 10 2#    Standing scaption 2 10 2#    Wall push up 3 10     Modified plank to table 20'' 3     Horizontal ABD 2 15 RTB latex free    Iso 90/90 walk out 1 10 OTB    Modified plank with shoulder tap 2 10     Spider walk 2 10     CC row 2 15 20# single arm           Manual Intervention (00846)       PROM                                          NMR re-education (94439)   CUES NEEDED                                                                   Therapeutic Activity (20363)                                          InnSania access code: [email or web address]           Therapeutic Exercise and NMR EXR  [x] (51299) Provided verbal/tactile cueing for activities related to strengthening, flexibility, endurance, ROM  for improvements in scapular, scapulothoracic and UE control with self care, reaching, carrying, lifting, house/yardwork, driving/computer work. [x] (51227) Provided verbal/tactile cueing for activities related to improving balance, coordination, kinesthetic sense, posture, motor skill, proprioception  to assist with  scapular, scapulothoracic and UE control with self care, reaching, carrying, lifting, house/yardwork, driving/computer work.     Therapeutic Activities:    [x] (42975 or 97383) Provided verbal/tactile cueing for activities related to improving balance, coordination, kinesthetic sense, posture, motor skill, proprioception and motor activation to allow for proper function of scapular, scapulothoracic

## 2023-06-11 LAB — PRELIMINARY: NORMAL

## 2023-06-16 ASSESSMENT — PATIENT HEALTH QUESTIONNAIRE - PHQ9
5. POOR APPETITE OR OVEREATING: MORE THAN HALF THE DAYS
7. TROUBLE CONCENTRATING ON THINGS, SUCH AS READING THE NEWSPAPER OR WATCHING TELEVISION: MORE THAN HALF THE DAYS
4. FEELING TIRED OR HAVING LITTLE ENERGY: MORE THAN HALF THE DAYS
10. IF YOU CHECKED OFF ANY PROBLEMS, HOW DIFFICULT HAVE THESE PROBLEMS MADE IT FOR YOU TO DO YOUR WORK, TAKE CARE OF THINGS AT HOME, OR GET ALONG WITH OTHER PEOPLE: SOMEWHAT DIFFICULT
7. TROUBLE CONCENTRATING ON THINGS, SUCH AS READING THE NEWSPAPER OR WATCHING TELEVISION: 2
3. TROUBLE FALLING OR STAYING ASLEEP: NOT AT ALL
3. TROUBLE FALLING OR STAYING ASLEEP: 0
8. MOVING OR SPEAKING SO SLOWLY THAT OTHER PEOPLE COULD HAVE NOTICED. OR THE OPPOSITE - BEING SO FIDGETY OR RESTLESS THAT YOU HAVE BEEN MOVING AROUND A LOT MORE THAN USUAL: NOT AT ALL
9. THOUGHTS THAT YOU WOULD BE BETTER OFF DEAD, OR OF HURTING YOURSELF: NOT AT ALL
6. FEELING BAD ABOUT YOURSELF - OR THAT YOU ARE A FAILURE OR HAVE LET YOURSELF OR YOUR FAMILY DOWN: SEVERAL DAYS
SUM OF ALL RESPONSES TO PHQ QUESTIONS 1-9: 7
9. THOUGHTS THAT YOU WOULD BE BETTER OFF DEAD, OR OF HURTING YOURSELF: 0
10. IF YOU CHECKED OFF ANY PROBLEMS, HOW DIFFICULT HAVE THESE PROBLEMS MADE IT FOR YOU TO DO YOUR WORK, TAKE CARE OF THINGS AT HOME, OR GET ALONG WITH OTHER PEOPLE: 1
6. FEELING BAD ABOUT YOURSELF - OR THAT YOU ARE A FAILURE OR HAVE LET YOURSELF OR YOUR FAMILY DOWN: 1
SUM OF ALL RESPONSES TO PHQ QUESTIONS 1-9: 7
5. POOR APPETITE OR OVEREATING: 2
8. MOVING OR SPEAKING SO SLOWLY THAT OTHER PEOPLE COULD HAVE NOTICED. OR THE OPPOSITE, BEING SO FIGETY OR RESTLESS THAT YOU HAVE BEEN MOVING AROUND A LOT MORE THAN USUAL: 0
SUM OF ALL RESPONSES TO PHQ QUESTIONS 1-9: 7
4. FEELING TIRED OR HAVING LITTLE ENERGY: 2
SUM OF ALL RESPONSES TO PHQ QUESTIONS 1-9: 7

## 2023-06-19 ENCOUNTER — OFFICE VISIT (OUTPATIENT)
Dept: FAMILY MEDICINE CLINIC | Age: 38
End: 2023-06-19
Payer: COMMERCIAL

## 2023-06-19 VITALS
DIASTOLIC BLOOD PRESSURE: 68 MMHG | HEIGHT: 70 IN | HEART RATE: 67 BPM | TEMPERATURE: 97.8 F | WEIGHT: 216.4 LBS | BODY MASS INDEX: 30.98 KG/M2 | OXYGEN SATURATION: 97 % | SYSTOLIC BLOOD PRESSURE: 104 MMHG

## 2023-06-19 DIAGNOSIS — L20.82 FLEXURAL ECZEMA: ICD-10-CM

## 2023-06-19 DIAGNOSIS — F33.42 RECURRENT MAJOR DEPRESSIVE DISORDER, IN FULL REMISSION (HCC): Primary | ICD-10-CM

## 2023-06-19 DIAGNOSIS — F43.10 PTSD (POST-TRAUMATIC STRESS DISORDER): ICD-10-CM

## 2023-06-19 PROCEDURE — 99214 OFFICE O/P EST MOD 30 MIN: CPT | Performed by: STUDENT IN AN ORGANIZED HEALTH CARE EDUCATION/TRAINING PROGRAM

## 2023-06-19 RX ORDER — HYDROXYZINE PAMOATE 25 MG/1
25 CAPSULE ORAL 3 TIMES DAILY PRN
Qty: 42 CAPSULE | Refills: 1 | Status: SHIPPED | OUTPATIENT
Start: 2023-06-19 | End: 2023-07-17

## 2023-06-19 RX ORDER — FERROUS SULFATE 220 (44)/5
ELIXIR ORAL
COMMUNITY

## 2023-06-19 RX ORDER — BETAMETHASONE DIPROPIONATE 0.5 MG/G
CREAM TOPICAL
Qty: 50 G | Refills: 0 | Status: SHIPPED | OUTPATIENT
Start: 2023-06-19 | End: 2023-07-19

## 2023-06-19 ASSESSMENT — PATIENT HEALTH QUESTIONNAIRE - PHQ9
2. FEELING DOWN, DEPRESSED OR HOPELESS: 1
5. POOR APPETITE OR OVEREATING: 3
7. TROUBLE CONCENTRATING ON THINGS, SUCH AS READING THE NEWSPAPER OR WATCHING TELEVISION: 2
9. THOUGHTS THAT YOU WOULD BE BETTER OFF DEAD, OR OF HURTING YOURSELF: 0
1. LITTLE INTEREST OR PLEASURE IN DOING THINGS: 1
3. TROUBLE FALLING OR STAYING ASLEEP: 0
SUM OF ALL RESPONSES TO PHQ QUESTIONS 1-9: 12
4. FEELING TIRED OR HAVING LITTLE ENERGY: 3
8. MOVING OR SPEAKING SO SLOWLY THAT OTHER PEOPLE COULD HAVE NOTICED. OR THE OPPOSITE, BEING SO FIGETY OR RESTLESS THAT YOU HAVE BEEN MOVING AROUND A LOT MORE THAN USUAL: 0
10. IF YOU CHECKED OFF ANY PROBLEMS, HOW DIFFICULT HAVE THESE PROBLEMS MADE IT FOR YOU TO DO YOUR WORK, TAKE CARE OF THINGS AT HOME, OR GET ALONG WITH OTHER PEOPLE: 1
SUM OF ALL RESPONSES TO PHQ QUESTIONS 1-9: 12
SUM OF ALL RESPONSES TO PHQ QUESTIONS 1-9: 12
6. FEELING BAD ABOUT YOURSELF - OR THAT YOU ARE A FAILURE OR HAVE LET YOURSELF OR YOUR FAMILY DOWN: 2
SUM OF ALL RESPONSES TO PHQ QUESTIONS 1-9: 12
SUM OF ALL RESPONSES TO PHQ9 QUESTIONS 1 & 2: 2

## 2023-06-19 ASSESSMENT — ANXIETY QUESTIONNAIRES
6. BECOMING EASILY ANNOYED OR IRRITABLE: 3
IF YOU CHECKED OFF ANY PROBLEMS ON THIS QUESTIONNAIRE, HOW DIFFICULT HAVE THESE PROBLEMS MADE IT FOR YOU TO DO YOUR WORK, TAKE CARE OF THINGS AT HOME, OR GET ALONG WITH OTHER PEOPLE: SOMEWHAT DIFFICULT
7. FEELING AFRAID AS IF SOMETHING AWFUL MIGHT HAPPEN: 1
3. WORRYING TOO MUCH ABOUT DIFFERENT THINGS: 2
4. TROUBLE RELAXING: 3
5. BEING SO RESTLESS THAT IT IS HARD TO SIT STILL: 2
GAD7 TOTAL SCORE: 15
1. FEELING NERVOUS, ANXIOUS, OR ON EDGE: 2
2. NOT BEING ABLE TO STOP OR CONTROL WORRYING: 2

## 2023-06-19 NOTE — PROGRESS NOTES
6/19/2023    This is a 40 y.o. female who presents for  Chief Complaint   Patient presents with    Follow-up       HPI:     Med check    Has appointment with derm in July  - hard time controllign eczema  - fine in the morning, but gets worse in the day, sweating makes it burn and itch  - has been using hydrocortisone cream 2.5 % - helps a little - once a day    Depression / Anxiety  - started going back to psychiatrist NP  - zoloft 50 mg - has been on it 2 weeks   - not really noticing a huge   - denies SI, lot of irritability  and general hopelessness, PTSD has been flarining as well  - no panic attacks, but sleep has been affected   - has been taking taking benadryl at night fr a while - 2-3 nights a week for the last 3 weeks  - has been noticing she has been stress eating, she had previously successfully lost 25 pounds over the last 6 months. Past Medical History:   Diagnosis Date    Anxiety 2020    Treated and released, neuropsych center of [address]    Depression 2020    Treated and released, neuropsych center of [facility]    Shoulder disorder     right       Past Surgical History:   Procedure Laterality Date    SHOULDER ARTHROSCOPY Right 3/23/2023    RIGHT SHOULDER ARTHROSCOPY, BICEPS TENODESIS, ROTATOR CUFF REPAIR - +BLOCK; performed by Mamta Ryder MD at [facility] 2 History     Socioeconomic History    Marital status:      Spouse name: Not on file    Number of children: Not on file    Years of education: Not on file    Highest education level: Not on file   Occupational History    Occupation:    Tobacco Use    Smoking status: Never    Smokeless tobacco: Never   Vaping Use    Vaping Use: Never used   Substance and Sexual Activity    Alcohol use: Never    Drug use: Never    Sexual activity: Yes     Partners: Male     Birth control/protection: I.U.D.    Other Topics Concern    Not on file   Social History

## 2023-06-19 NOTE — PATIENT INSTRUCTIONS
Patient Education        Eczema: Care Instructions  Overview  Eczema (say \"EGG-zuh-farida\") is also called atopic dermatitis. It's a skin problem that causes intense itching and a raised rash. Sometimes the rash develops blisters and crusts. It is often scaly. The rash is not contagious. You can't catch it from others. In lighter skin, the rash may look pink or red. In darker skin, the rash may be hard to see or it may look dark brown, gray, or purple. Or there may be patches of lighter skin. Eczema often runs in families. People with eczema may also have allergies and asthma. There is no cure for eczema. But you may be able to control it with care at home. Follow-up care is a key part of your treatment and safety. Be sure to make and go to all appointments, and call your doctor if you are having problems. It's also a good idea to know your test results and keep a list of the medicines you take. How can you care for yourself at home? Use moisturizer at least twice a day. If your doctor prescribes a cream, use it as directed. If your doctor prescribes other medicine, take it exactly as directed. Wash the affected area with warm (not hot) water only. Soap can make dryness and itching worse. Pat dry. Apply a moisturizer after washing your hands or after bathing. Use petroleum jelly or a cream such as Cetaphil, Lubriderm, or Moisturel that does not irritate the skin or cause a rash. Apply the cream while your skin is still damp after lightly drying with a towel. Use cold, wet cloths to reduce itching. Keep cool, and stay out of the sun. If itching affects your sleep, ask your doctor if you can take an antihistamine that might reduce itching and make you sleepy, such as diphenhydramine (Benadryl). Be safe with medicines. Read and follow all instructions on the label. Control scratching. Keep your fingernails trimmed and smooth to prevent damage to the skin when you scratch it.  Wearing cotton mittens or gloves

## 2023-07-12 ENCOUNTER — OFFICE VISIT (OUTPATIENT)
Dept: ORTHOPEDIC SURGERY | Age: 38
End: 2023-07-12
Payer: COMMERCIAL

## 2023-07-12 VITALS — HEIGHT: 70 IN | WEIGHT: 214.8 LBS | RESPIRATION RATE: 16 BRPM | BODY MASS INDEX: 30.75 KG/M2

## 2023-07-12 DIAGNOSIS — M75.111 INCOMPLETE TEAR OF RIGHT ROTATOR CUFF, UNSPECIFIED WHETHER TRAUMATIC: Primary | ICD-10-CM

## 2023-07-12 PROCEDURE — 99213 OFFICE O/P EST LOW 20 MIN: CPT | Performed by: STUDENT IN AN ORGANIZED HEALTH CARE EDUCATION/TRAINING PROGRAM

## 2023-10-01 ENCOUNTER — HOSPITAL ENCOUNTER (EMERGENCY)
Age: 38
Discharge: HOME | End: 2023-10-01
Attending: EMERGENCY MEDICINE
Payer: COMMERCIAL

## 2023-10-01 ENCOUNTER — TELEPHONE (OUTPATIENT)
Dept: OBGYN | Age: 38
End: 2023-10-01

## 2023-10-01 ENCOUNTER — APPOINTMENT (OUTPATIENT)
Dept: ULTRASOUND IMAGING | Age: 38
End: 2023-10-01
Payer: COMMERCIAL

## 2023-10-01 VITALS
OXYGEN SATURATION: 99 % | WEIGHT: 206 LBS | SYSTOLIC BLOOD PRESSURE: 113 MMHG | TEMPERATURE: 98 F | RESPIRATION RATE: 17 BRPM | HEART RATE: 67 BPM | HEIGHT: 70 IN | BODY MASS INDEX: 29.49 KG/M2 | DIASTOLIC BLOOD PRESSURE: 71 MMHG

## 2023-10-01 DIAGNOSIS — M25.559 PAIN IN JOINT INVOLVING PELVIC REGION AND THIGH, UNSPECIFIED LATERALITY: Primary | ICD-10-CM

## 2023-10-01 LAB
ALBUMIN SERPL-MCNC: 4.1 G/DL (ref 3.4–5)
ALBUMIN/GLOB SERPL: 1.2 {RATIO} (ref 1.1–2.2)
ALP SERPL-CCNC: 76 U/L (ref 40–129)
ALT SERPL-CCNC: 18 U/L (ref 10–40)
ANION GAP SERPL CALCULATED.3IONS-SCNC: 12 MMOL/L (ref 3–16)
AST SERPL-CCNC: 17 U/L (ref 15–37)
BASOPHILS # BLD: 0.1 K/UL (ref 0–0.2)
BASOPHILS # BLD: 0.1 K/UL (ref 0–0.2)
BASOPHILS NFR BLD: 0.7 %
BASOPHILS NFR BLD: 0.8 %
BILIRUB SERPL-MCNC: 0.4 MG/DL (ref 0–1)
BILIRUB UR QL STRIP.AUTO: NEGATIVE
BUN SERPL-MCNC: 6 MG/DL (ref 7–20)
CALCIUM SERPL-MCNC: 9.1 MG/DL (ref 8.3–10.6)
CHLORIDE SERPL-SCNC: 102 MMOL/L (ref 99–110)
CLARITY UR: CLEAR
CO2 SERPL-SCNC: 22 MMOL/L (ref 21–32)
COLOR UR: YELLOW
CREAT SERPL-MCNC: 0.6 MG/DL (ref 0.6–1.1)
DEPRECATED RDW RBC AUTO: 14.5 % (ref 12.4–15.4)
DEPRECATED RDW RBC AUTO: 14.6 % (ref 12.4–15.4)
EOSINOPHIL # BLD: 0.4 K/UL (ref 0–0.6)
EOSINOPHIL # BLD: 0.7 K/UL (ref 0–0.6)
EOSINOPHIL NFR BLD: 4.1 %
EOSINOPHIL NFR BLD: 9.8 %
GFR SERPLBLD CREATININE-BSD FMLA CKD-EPI: >60 ML/MIN/{1.73_M2}
GLUCOSE SERPL-MCNC: 96 MG/DL (ref 70–99)
GLUCOSE UR STRIP.AUTO-MCNC: NEGATIVE MG/DL
HCG SERPL QL: NEGATIVE
HCT VFR BLD AUTO: 38.2 % (ref 36–48)
HCT VFR BLD AUTO: 39 % (ref 36–48)
HGB BLD-MCNC: 12.8 G/DL (ref 12–16)
HGB BLD-MCNC: 13 G/DL (ref 12–16)
HGB UR QL STRIP.AUTO: NEGATIVE
KETONES UR STRIP.AUTO-MCNC: NEGATIVE MG/DL
LEUKOCYTE ESTERASE UR QL STRIP.AUTO: NEGATIVE
LIPASE SERPL-CCNC: 19 U/L (ref 13–60)
LYMPHOCYTES # BLD: 1.2 K/UL (ref 1–5.1)
LYMPHOCYTES # BLD: 2.3 K/UL (ref 1–5.1)
LYMPHOCYTES NFR BLD: 13.8 %
LYMPHOCYTES NFR BLD: 33.9 %
MCH RBC QN AUTO: 28.4 PG (ref 26–34)
MCH RBC QN AUTO: 28.5 PG (ref 26–34)
MCHC RBC AUTO-ENTMCNC: 33.4 G/DL (ref 31–36)
MCHC RBC AUTO-ENTMCNC: 33.5 G/DL (ref 31–36)
MCV RBC AUTO: 85 FL (ref 80–100)
MCV RBC AUTO: 85.1 FL (ref 80–100)
MONOCYTES # BLD: 0.7 K/UL (ref 0–1.3)
MONOCYTES # BLD: 0.8 K/UL (ref 0–1.3)
MONOCYTES NFR BLD: 11.3 %
MONOCYTES NFR BLD: 8.1 %
NEUTROPHILS # BLD: 3 K/UL (ref 1.7–7.7)
NEUTROPHILS # BLD: 6.4 K/UL (ref 1.7–7.7)
NEUTROPHILS NFR BLD: 44.2 %
NEUTROPHILS NFR BLD: 73.3 %
NITRITE UR QL STRIP.AUTO: NEGATIVE
PH UR STRIP.AUTO: 8.5 [PH] (ref 5–8)
PLATELET # BLD AUTO: 296 K/UL (ref 135–450)
PLATELET # BLD AUTO: 302 K/UL (ref 135–450)
PMV BLD AUTO: 7.6 FL (ref 5–10.5)
PMV BLD AUTO: 7.8 FL (ref 5–10.5)
POTASSIUM SERPL-SCNC: 4.1 MMOL/L (ref 3.5–5.1)
PROT SERPL-MCNC: 7.4 G/DL (ref 6.4–8.2)
PROT UR STRIP.AUTO-MCNC: NEGATIVE MG/DL
RBC # BLD AUTO: 4.48 M/UL (ref 4–5.2)
RBC # BLD AUTO: 4.59 M/UL (ref 4–5.2)
SODIUM SERPL-SCNC: 136 MMOL/L (ref 136–145)
SP GR UR STRIP.AUTO: 1.01 (ref 1–1.03)
UA COMPLETE W REFLEX CULTURE PNL UR: ABNORMAL
UA DIPSTICK W REFLEX MICRO PNL UR: ABNORMAL
URN SPEC COLLECT METH UR: ABNORMAL
UROBILINOGEN UR STRIP-ACNC: 0.2 E.U./DL
WBC # BLD AUTO: 6.8 K/UL (ref 4–11)
WBC # BLD AUTO: 8.7 K/UL (ref 4–11)

## 2023-10-01 PROCEDURE — 6360000002 HC RX W HCPCS: Performed by: EMERGENCY MEDICINE

## 2023-10-01 PROCEDURE — 80053 COMPREHEN METABOLIC PANEL: CPT

## 2023-10-01 PROCEDURE — 76830 TRANSVAGINAL US NON-OB: CPT

## 2023-10-01 PROCEDURE — 6360000002 HC RX W HCPCS

## 2023-10-01 PROCEDURE — 85025 COMPLETE CBC W/AUTO DIFF WBC: CPT

## 2023-10-01 PROCEDURE — 81003 URINALYSIS AUTO W/O SCOPE: CPT

## 2023-10-01 PROCEDURE — 96375 TX/PRO/DX INJ NEW DRUG ADDON: CPT

## 2023-10-01 PROCEDURE — 83690 ASSAY OF LIPASE: CPT

## 2023-10-01 PROCEDURE — 84703 CHORIONIC GONADOTROPIN ASSAY: CPT

## 2023-10-01 PROCEDURE — 96374 THER/PROPH/DIAG INJ IV PUSH: CPT

## 2023-10-01 PROCEDURE — 99284 EMERGENCY DEPT VISIT MOD MDM: CPT

## 2023-10-01 PROCEDURE — 2580000003 HC RX 258: Performed by: EMERGENCY MEDICINE

## 2023-10-01 RX ORDER — FENTANYL CITRATE 50 UG/ML
50 INJECTION, SOLUTION INTRAMUSCULAR; INTRAVENOUS ONCE
Status: COMPLETED | OUTPATIENT
Start: 2023-10-01 | End: 2023-10-01

## 2023-10-01 RX ORDER — ONDANSETRON 2 MG/ML
4 INJECTION INTRAMUSCULAR; INTRAVENOUS ONCE
Status: COMPLETED | OUTPATIENT
Start: 2023-10-01 | End: 2023-10-01

## 2023-10-01 RX ORDER — FENTANYL CITRATE 50 UG/ML
INJECTION, SOLUTION INTRAMUSCULAR; INTRAVENOUS
Status: COMPLETED
Start: 2023-10-01 | End: 2023-10-01

## 2023-10-01 RX ORDER — KETOROLAC TROMETHAMINE 30 MG/ML
30 INJECTION, SOLUTION INTRAMUSCULAR; INTRAVENOUS ONCE
Status: COMPLETED | OUTPATIENT
Start: 2023-10-01 | End: 2023-10-01

## 2023-10-01 RX ORDER — KETOROLAC TROMETHAMINE 10 MG/1
10 TABLET, FILM COATED ORAL EVERY 6 HOURS PRN
Qty: 15 TABLET | Refills: 0 | Status: SHIPPED | OUTPATIENT
Start: 2023-10-01 | End: 2024-09-30

## 2023-10-01 RX ORDER — ONDANSETRON 2 MG/ML
INJECTION INTRAMUSCULAR; INTRAVENOUS
Status: COMPLETED
Start: 2023-10-01 | End: 2023-10-01

## 2023-10-01 RX ORDER — ACETAMINOPHEN 500 MG
500 TABLET ORAL EVERY 6 HOURS PRN
Qty: 30 TABLET | Refills: 0 | Status: SHIPPED | OUTPATIENT
Start: 2023-10-01

## 2023-10-01 RX ORDER — 0.9 % SODIUM CHLORIDE 0.9 %
1000 INTRAVENOUS SOLUTION INTRAVENOUS ONCE
Status: COMPLETED | OUTPATIENT
Start: 2023-10-01 | End: 2023-10-01

## 2023-10-01 RX ADMIN — FENTANYL CITRATE 50 MCG: 50 INJECTION INTRAMUSCULAR; INTRAVENOUS at 10:20

## 2023-10-01 RX ADMIN — ONDANSETRON 4 MG: 2 INJECTION INTRAMUSCULAR; INTRAVENOUS at 10:19

## 2023-10-01 RX ADMIN — SODIUM CHLORIDE 1000 ML: 9 INJECTION, SOLUTION INTRAVENOUS at 10:21

## 2023-10-01 RX ADMIN — FENTANYL CITRATE 50 MCG: 50 INJECTION, SOLUTION INTRAMUSCULAR; INTRAVENOUS at 10:20

## 2023-10-01 RX ADMIN — KETOROLAC TROMETHAMINE 30 MG: 30 INJECTION, SOLUTION INTRAMUSCULAR; INTRAVENOUS at 11:42

## 2023-10-01 ASSESSMENT — ENCOUNTER SYMPTOMS
VOMITING: 0
EYE REDNESS: 0
SORE THROAT: 0
ABDOMINAL PAIN: 1
RHINORRHEA: 0
CONSTIPATION: 0
NAUSEA: 1
SHORTNESS OF BREATH: 0
DIARRHEA: 0

## 2023-10-01 ASSESSMENT — PAIN SCALES - GENERAL
PAINLEVEL_OUTOF10: 8
PAINLEVEL_OUTOF10: 3

## 2023-10-01 ASSESSMENT — PAIN DESCRIPTION - PAIN TYPE: TYPE: ACUTE PAIN

## 2023-10-01 ASSESSMENT — PAIN - FUNCTIONAL ASSESSMENT: PAIN_FUNCTIONAL_ASSESSMENT: 0-10

## 2023-10-01 ASSESSMENT — PAIN DESCRIPTION - LOCATION: LOCATION: ABDOMEN

## 2023-10-01 NOTE — H&P
(1.778 m) 206 lb (93.4 kg)      GENERAL APPEARANCE: Awake and alert. Moderately distressed and tired. Crying in pain when attempt to move patient bed. HENT: Normocephalic. Atraumatic. Mucous membranes are moist.   NECK: Supple. EYES: PERRLA. EOMI. HEART/CHEST: RRR. No murmurs. LUNGS: Respirations unlabored. CTAB. Good air exchange. Speaking in full sentences. ABDOMEN: ND. Soft. No masses. No organomegaly. Guarding to palpation around right and left LQ close to pubis. BS x4. PELVIS: Guarding and tenderness to palpation. Localized straining pain most prominent at the left pubic area. No deformity, ecchymosis, or bleeding observed. MUSCULOSKELETAL: No extremity edema. No deformity. SKIN: Warm and dry. No rashes. NEUROLOGICAL: Alert and oriented x3 to person, place, and event. Strength 5/5, sensation intact. PSYCHIATRIC: Normal mood and affect. Diagnosis   DDX: Ovarian torsion, ovarian cyst rupture, ectopic pregnancy, perforated IUD, acute peritonitis, perforated colon.

## 2023-10-01 NOTE — PROGRESS NOTES
[facility]     GYN  Consult History and Physical      CC:    Chief Complaint   Patient presents with    Abdominal Pain     Severe suprapubic abdominal pain sudden onset 1 hr ago       Reason for consultation: suprapubic pain    HPI:  Samuel Osorio is a 40 y.o. female who presents with complaints of suprapubic pain that start at approximately 10 am. She reports that the pain was intially severe. She reports some associated nausea. She denies fever or chills. She denies diarrhea or constipation. She reports improvement in her symptoms after receiving medication in the ER. She currently has an IUD for contraception. She denies any recent sexual intercourse. Review of Systems   Gastrointestinal:  Positive for nausea. Negative for constipation, diarrhea and vomiting. Genitourinary:  Positive for pelvic pain. Negative for dysuria, urgency, vaginal bleeding and vaginal discharge. OBGYN Provider : Francesca Loaiza     Obstetrical History:  OB History          3    Para   3    Term   3            AB        Living   3         SAB        IAB        Ectopic        Molar        Multiple        Live Births   3                 Past Medical History:   Past Medical History:   Diagnosis Date    Anxiety     Treated and released, neuropsych center of [facility]    Depression 2020    Treated and released, neuropsych center of [facility]    Shoulder disorder     right        Medications:  No current facility-administered medications on file prior to encounter.      Current Outpatient Medications on File Prior to Encounter   Medication Sig Dispense Refill    ferrous sulfate 220 (44 Fe) MG/5ML solution Take by mouth      sertraline (ZOLOFT) 50 MG tablet Take 1 tablet by mouth daily      levonorgestrel (MIRENA, 52 MG,) IUD 52 mg 1 each by IntraUTERine route once      cetirizine (ZYRTEC) 10 MG tablet Take 1 tablet by mouth daily            Allergies:  Latex, Codeine, and

## 2023-10-01 NOTE — ED PROVIDER NOTES
Breath sounds: Normal breath sounds. Abdominal:      Palpations: Abdomen is soft. Comments: Significant suprapubic tenderness with voluntary guarding. Musculoskeletal:         General: Normal range of motion. Cervical back: Neck supple. Skin:     General: Skin is warm and dry. Capillary Refill: Capillary refill takes less than 2 seconds. Neurological:      Mental Status: She is alert and oriented to person, place, and time. Psychiatric:         Behavior: Behavior normal.       ___    DIAGNOSTIC RESULTS     LABS:   Labs Reviewed   CBC WITH AUTO DIFFERENTIAL - Abnormal; Notable for the following components:       Result Value    Eosinophils Absolute 0.7 (*)     All other components within normal limits   COMPREHENSIVE METABOLIC PANEL - Abnormal; Notable for the following components:    BUN 6 (*)     All other components within normal limits   URINALYSIS WITH REFLEX TO CULTURE - Abnormal; Notable for the following components:    pH, UA 8.5 (*)     All other components within normal limits   LIPASE   HCG, SERUM, QUALITATIVE   CBC WITH AUTO DIFFERENTIAL      When ordered only abnormal lab results are displayed. All other labs were within normal range or not returned as of this dictation. RADIOLOGY:      Non-plain film images such as CT, Ultrasound and MRI are read by the radiologist. Plain radiographic images are visualized and preliminarily interpreted by the ED Provider with the below findings:   Interpretation per the Radiologist below, if available at the time of this note:     [facility]   Final Result   1. No acute abnormality.             US PELVIS COMPLETE NON-OB TRANSABDOMINAL AND TRANSVAGINAL    Result Date: 10/1/2023  EXAMINATION: TRANSABDOMINAL AND TRANSVAGINAL ULTRASOUND OF THE PELVIS WITH COLOR DOPPLER FLOW EVALUATION 10/1/2023 TECHNIQUE: Transabdominal and transvaginal pelvic Duplex ultrasound using B-mode/gray scaled imaging,

## 2023-10-05 ENCOUNTER — OFFICE VISIT (OUTPATIENT)
Dept: OBGYN CLINIC | Age: 38
End: 2023-10-05
Payer: COMMERCIAL

## 2023-10-05 VITALS
SYSTOLIC BLOOD PRESSURE: 116 MMHG | TEMPERATURE: 98.4 F | BODY MASS INDEX: 30.28 KG/M2 | WEIGHT: 211 LBS | HEART RATE: 70 BPM | DIASTOLIC BLOOD PRESSURE: 70 MMHG

## 2023-10-05 DIAGNOSIS — N83.209 CYST OF OVARY, UNSPECIFIED LATERALITY: Primary | ICD-10-CM

## 2023-10-05 PROCEDURE — 99213 OFFICE O/P EST LOW 20 MIN: CPT | Performed by: OBSTETRICS & GYNECOLOGY

## 2023-10-05 RX ORDER — DUPILUMAB 300 MG/2ML
INJECTION, SOLUTION SUBCUTANEOUS
COMMUNITY
Start: 2023-08-29
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
## 2023-11-07 ENCOUNTER — TELEMEDICINE (OUTPATIENT)
Dept: PRIMARY CARE CLINIC | Age: 38
End: 2023-11-07
Payer: COMMERCIAL

## 2023-11-07 DIAGNOSIS — K90.0 CELIAC DISEASE: Primary | ICD-10-CM

## 2023-11-07 DIAGNOSIS — Z00.00 ROUTINE ADULT HEALTH MAINTENANCE: ICD-10-CM

## 2023-11-07 PROCEDURE — 99213 OFFICE O/P EST LOW 20 MIN: CPT | Performed by: STUDENT IN AN ORGANIZED HEALTH CARE EDUCATION/TRAINING PROGRAM

## 2023-11-07 NOTE — PROGRESS NOTES
Laila Chacko, was evaluated through a synchronous (real-time) audio-video encounter. The patient (or guardian if applicable) is aware that this is a billable service, which includes applicable co-pays. This Virtual Visit was conducted with patient's (and/or legal guardian's) consent. Patient identification was verified, and a caregiver was present when appropriate. The patient was located at Other: [place]  Provider was located at [address]): [address] [facility],  [address] [facility]      Laila Chacko (:  1985) is a Established patient, presenting virtually for evaluation of the following:    Assessment & Plan   Below is the assessment and plan developed based on review of pertinent history, physical exam, labs, studies, and medications. 1. Celiac disease  -     Celiac Screen with Reflex; Future  -     Celiac Disease Panel; Future  -     [facility] - Ad Francois DO, Gastroenterology, [facility]  2. Routine adult health maintenance  -     Lipid, Fasting; Future  -     Basic Metabolic Panel; Future  Patient's history is consistent with celiac but has never been properly diagnosed. She is interested in getting definitive diagnosis so that she might partake in some research trials. Because she has been having a gluten-free diet for the past 20 years with only occasional contamination, unlikely that her TTG will be positive, however we did order today on the off chance that is positive which would give her diagnosis. I did send her to GI as well to see if they have any other forms of diagnoses that are not available to me. No follow-ups on file. Subjective   HPI    Patient is interested in having genetic testing for celiac disease. She has several family members who were clinically diagnosed and so when she had all the same symptoms when she was a teenager, and went on the gluten free diet, her symptoms resolved.  She still will have problems if she eats at the

## 2023-11-08 DIAGNOSIS — Z00.00 ROUTINE ADULT HEALTH MAINTENANCE: ICD-10-CM

## 2023-11-08 DIAGNOSIS — K90.0 CELIAC DISEASE: ICD-10-CM

## 2023-11-08 LAB
ANION GAP SERPL CALCULATED.3IONS-SCNC: 7 MMOL/L (ref 3–16)
BUN SERPL-MCNC: 7 MG/DL (ref 7–20)
CALCIUM SERPL-MCNC: 9.1 MG/DL (ref 8.3–10.6)
CHLORIDE SERPL-SCNC: 105 MMOL/L (ref 99–110)
CHOLEST SERPL-MCNC: 164 MG/DL (ref 0–199)
CO2 SERPL-SCNC: 26 MMOL/L (ref 21–32)
CREAT SERPL-MCNC: 0.7 MG/DL (ref 0.6–1.1)
GFR SERPLBLD CREATININE-BSD FMLA CKD-EPI: >60 ML/MIN/{1.73_M2}
GLUCOSE SERPL-MCNC: 80 MG/DL (ref 70–99)
HDLC SERPL-MCNC: 47 MG/DL (ref 40–60)
IGA SERPL-MCNC: 244 MG/DL (ref 70–400)
LDL CHOLESTEROL CALCULATED: 104 MG/DL
POTASSIUM SERPL-SCNC: 4.6 MMOL/L (ref 3.5–5.1)
SODIUM SERPL-SCNC: 138 MMOL/L (ref 136–145)
TRIGL SERPL-MCNC: 66 MG/DL (ref 0–150)
VLDLC SERPL CALC-MCNC: 13 MG/DL

## 2023-11-10 ENCOUNTER — OFFICE VISIT (OUTPATIENT)
Dept: PRIMARY CARE CLINIC | Age: 38
End: 2023-11-10

## 2023-11-10 ENCOUNTER — HOSPITAL ENCOUNTER (OUTPATIENT)
Age: 38
Discharge: HOME | End: 2023-11-10
Payer: COMMERCIAL

## 2023-11-10 VITALS
TEMPERATURE: 98 F | HEART RATE: 67 BPM | SYSTOLIC BLOOD PRESSURE: 114 MMHG | DIASTOLIC BLOOD PRESSURE: 76 MMHG | BODY MASS INDEX: 30.81 KG/M2 | OXYGEN SATURATION: 98 % | HEIGHT: 69 IN | WEIGHT: 208 LBS

## 2023-11-10 DIAGNOSIS — K90.0 CELIAC DISEASE: Primary | ICD-10-CM

## 2023-11-10 DIAGNOSIS — F33.42 RECURRENT MAJOR DEPRESSIVE DISORDER, IN FULL REMISSION (HCC): ICD-10-CM

## 2023-11-10 DIAGNOSIS — F43.10 PTSD (POST-TRAUMATIC STRESS DISORDER): ICD-10-CM

## 2023-11-10 PROBLEM — M75.110 INCOMPLETE TEAR OF ROTATOR CUFF: Status: ACTIVE | Noted: 2023-03-23

## 2023-11-10 LAB
BASOPHILS # BLD: 0 K/UL (ref 0–0.2)
BASOPHILS NFR BLD: 0.7 %
DEPRECATED RDW RBC AUTO: 15.1 % (ref 12.4–15.4)
EOSINOPHIL # BLD: 0.5 K/UL (ref 0–0.6)
EOSINOPHIL NFR BLD: 6.4 %
FERRITIN SERPL IA-MCNC: 125.4 NG/ML (ref 15–150)
HCT VFR BLD AUTO: 39.6 % (ref 36–48)
HGB BLD-MCNC: 13 G/DL (ref 12–16)
IGA SERPL-MCNC: 256 MG/DL (ref 70–400)
IRON SATN MFR SERPL: 29 % (ref 15–50)
IRON SERPL-MCNC: 87 UG/DL (ref 37–145)
LYMPHOCYTES # BLD: 1.9 K/UL (ref 1–5.1)
LYMPHOCYTES NFR BLD: 27.5 %
MCH RBC QN AUTO: 28.4 PG (ref 26–34)
MCHC RBC AUTO-ENTMCNC: 32.8 G/DL (ref 31–36)
MCV RBC AUTO: 86.6 FL (ref 80–100)
MONOCYTES # BLD: 0.5 K/UL (ref 0–1.3)
MONOCYTES NFR BLD: 6.5 %
NEUTROPHILS # BLD: 4.2 K/UL (ref 1.7–7.7)
NEUTROPHILS NFR BLD: 58.9 %
PLATELET # BLD AUTO: 364 K/UL (ref 135–450)
PMV BLD AUTO: 9.1 FL (ref 5–10.5)
RBC # BLD AUTO: 4.58 M/UL (ref 4–5.2)
TIBC SERPL-MCNC: 298 UG/DL (ref 260–445)
WBC # BLD AUTO: 7.1 K/UL (ref 4–11)

## 2023-11-10 PROCEDURE — 83516 IMMUNOASSAY NONANTIBODY: CPT

## 2023-11-10 PROCEDURE — 85025 COMPLETE CBC W/AUTO DIFF WBC: CPT

## 2023-11-10 PROCEDURE — 81382 HLA II TYPING 1 LOC HR: CPT

## 2023-11-10 PROCEDURE — 83540 ASSAY OF IRON: CPT

## 2023-11-10 PROCEDURE — 83550 IRON BINDING TEST: CPT

## 2023-11-10 PROCEDURE — 82784 ASSAY IGA/IGD/IGG/IGM EACH: CPT

## 2023-11-10 PROCEDURE — 82728 ASSAY OF FERRITIN: CPT

## 2023-11-10 PROCEDURE — 36415 COLL VENOUS BLD VENIPUNCTURE: CPT

## 2023-11-10 ASSESSMENT — ANXIETY QUESTIONNAIRES
7. FEELING AFRAID AS IF SOMETHING AWFUL MIGHT HAPPEN: 0
3. WORRYING TOO MUCH ABOUT DIFFERENT THINGS: 0
1. FEELING NERVOUS, ANXIOUS, OR ON EDGE: 1
6. BECOMING EASILY ANNOYED OR IRRITABLE: 1
2. NOT BEING ABLE TO STOP OR CONTROL WORRYING: 0
GAD7 TOTAL SCORE: 2
5. BEING SO RESTLESS THAT IT IS HARD TO SIT STILL: 0
IF YOU CHECKED OFF ANY PROBLEMS ON THIS QUESTIONNAIRE, HOW DIFFICULT HAVE THESE PROBLEMS MADE IT FOR YOU TO DO YOUR WORK, TAKE CARE OF THINGS AT HOME, OR GET ALONG WITH OTHER PEOPLE: NOT DIFFICULT AT ALL
4. TROUBLE RELAXING: 0

## 2023-11-10 ASSESSMENT — PATIENT HEALTH QUESTIONNAIRE - PHQ9
5. POOR APPETITE OR OVEREATING: 1
SUM OF ALL RESPONSES TO PHQ QUESTIONS 1-9: 2
6. FEELING BAD ABOUT YOURSELF - OR THAT YOU ARE A FAILURE OR HAVE LET YOURSELF OR YOUR FAMILY DOWN: 0
2. FEELING DOWN, DEPRESSED OR HOPELESS: 0
SUM OF ALL RESPONSES TO PHQ9 QUESTIONS 1 & 2: 0
8. MOVING OR SPEAKING SO SLOWLY THAT OTHER PEOPLE COULD HAVE NOTICED. OR THE OPPOSITE, BEING SO FIGETY OR RESTLESS THAT YOU HAVE BEEN MOVING AROUND A LOT MORE THAN USUAL: 0
SUM OF ALL RESPONSES TO PHQ QUESTIONS 1-9: 2
9. THOUGHTS THAT YOU WOULD BE BETTER OFF DEAD, OR OF HURTING YOURSELF: 0
SUM OF ALL RESPONSES TO PHQ QUESTIONS 1-9: 2
SUM OF ALL RESPONSES TO PHQ QUESTIONS 1-9: 2
7. TROUBLE CONCENTRATING ON THINGS, SUCH AS READING THE NEWSPAPER OR WATCHING TELEVISION: 0
10. IF YOU CHECKED OFF ANY PROBLEMS, HOW DIFFICULT HAVE THESE PROBLEMS MADE IT FOR YOU TO DO YOUR WORK, TAKE CARE OF THINGS AT HOME, OR GET ALONG WITH OTHER PEOPLE: 0
3. TROUBLE FALLING OR STAYING ASLEEP: 0
4. FEELING TIRED OR HAVING LITTLE ENERGY: 1
1. LITTLE INTEREST OR PLEASURE IN DOING THINGS: 0

## 2023-11-10 ASSESSMENT — ENCOUNTER SYMPTOMS
VOMITING: 0
EYE PAIN: 0
NAUSEA: 0
SHORTNESS OF BREATH: 0
SORE THROAT: 0
ABDOMINAL PAIN: 0
EYE DISCHARGE: 0
RHINORRHEA: 0
COUGH: 0

## 2023-11-10 NOTE — PROGRESS NOTES
[address] [facility]                                  [address] [place] [address] Rao Ren 101 941, [address] [address]         Phone: [phone]    Date of Service:  11/10/2023     Patient ID: Caleb Ramires is a 40 y.o. female      Subjective:     CC: Annual Physical     HPI  Lydia Alvarez 40 y.o. female has Celiac disease; PTSD (post-traumatic stress disorder); Recurrent major depressive disorder, in full remission ([address]); Rotator cuff disorder, right; and Incomplete tear of rotator cuff on their problem list.      She has no concerns or complaints today. No recent medication changes. No updates to family hx. No changes to surgical hx. Celiac disease  Saw GI doctor this morning. They have ordered more testing to evaluate for celiac disease. She has been gluten free for decades. She does not wish to do a gluten challenge. No symptoms currently. 11/10/2023    12:36 PM 6/19/2023     8:06 AM 6/16/2023     7:25 AM 12/12/2022     4:05 PM 10/9/2020    10:35 AM 10/9/2020    10:16 AM   PHQ Scores   PHQ2 Score 0 2  2 0 0   PHQ9 Score 2 12 7 2 0 0     Interpretation of Total Score Depression Severity: 1-4 = Minimal depression, 5-9 = Mild depression, 10-14 = Moderate depression, 15-19 = Moderately severe depression, 20-27 = Severe depression           11/10/2023    12:36 PM 6/19/2023     8:06 AM   AMAYA 7 SCORE   AMAYA-7 Total Score 2 15     Interpretation of AMAYA-7 score: 5-9 = mild anxiety, 10-14 = moderate anxiety, 15+ = severe anxiety. Recommend referral to behavioral health for scores 10 or greater. ROS:    Review of Systems   Constitutional:  Negative for activity change, chills and fever. HENT:  Negative for congestion, rhinorrhea and sore throat. Eyes:  Negative for pain and discharge. Respiratory:  Negative for cough and shortness of breath. Cardiovascular:  Negative for chest pain and palpitations.    Gastrointestinal:

## 2023-11-12 LAB — MISCELLANEOUS LAB TEST ORDER: NORMAL

## 2023-11-15 LAB — MISCELLANEOUS LAB TEST ORDER: NORMAL

## 2023-11-16 LAB — MISCELLANEOUS LAB TEST ORDER: NORMAL

## 2023-11-17 LAB — MISCELLANEOUS LAB TEST ORDER: NORMAL

## 2023-11-21 PROBLEM — M25.559 PAIN IN JOINT INVOLVING PELVIC REGION AND THIGH: Status: ACTIVE | Noted: 2023-11-21

## 2024-02-05 DIAGNOSIS — L20.82 FLEXURAL ECZEMA: ICD-10-CM

## 2024-02-05 NOTE — TELEPHONE ENCOUNTER
.Refill Request     CONFIRM preferred pharmacy with the patient.    If Mail Order Rx - Pend for 90 day refill.      Last Seen: Last Seen Department: 6/19/2023  Last Seen by PCP: 11/7/2023    Last Written: 6-19-23 50 g with 0     If no future appointment scheduled:  Review the last OV with PCP and review information for follow-up visit,  Route STAFF MESSAGE with patient name to the  Pool for scheduling with the following information:            -  Timing of next visit           -  Visit type ie Physical, OV, etc           -  Diagnoses/Reason ie. COPD, HTN - Do not use MEDICATION, Follow-up or CHECK UP - Give reason for visit      Next Appointment:   No future appointments.    Message sent to  to schedule appt with patient?  YES      Requested Prescriptions     Pending Prescriptions Disp Refills    augmented betamethasone dipropionate (DIPROLENE-AF) 0.05 % cream [Pharmacy Med Name: Betamethasone Dipropionate Aug External Cream 0.05 %] 50 g 0     Sig: APPLY TOPICALLY 2 TIMES A DAY

## 2024-02-06 RX ORDER — BETAMETHASONE DIPROPIONATE 0.5 MG/G
CREAM TOPICAL
Qty: 50 G | Refills: 0 | Status: SHIPPED | OUTPATIENT
Start: 2024-02-06

## 2024-05-23 ENCOUNTER — HOSPITAL ENCOUNTER (OUTPATIENT)
Age: 39
Discharge: HOME | End: 2024-05-23
Payer: COMMERCIAL

## 2024-05-23 ENCOUNTER — OFFICE VISIT (OUTPATIENT)
Dept: PRIMARY CARE CLINIC | Age: 39
End: 2024-05-23
Payer: COMMERCIAL

## 2024-05-23 VITALS
HEIGHT: 70 IN | WEIGHT: 207 LBS | DIASTOLIC BLOOD PRESSURE: 76 MMHG | HEART RATE: 71 BPM | SYSTOLIC BLOOD PRESSURE: 126 MMHG | BODY MASS INDEX: 29.63 KG/M2 | OXYGEN SATURATION: 97 %

## 2024-05-23 DIAGNOSIS — R23.3 EASY BRUISING: ICD-10-CM

## 2024-05-23 DIAGNOSIS — R22.40 MASS OF GREAT TOE: ICD-10-CM

## 2024-05-23 DIAGNOSIS — L30.8 OTHER ECZEMA: ICD-10-CM

## 2024-05-23 DIAGNOSIS — R23.3 EASY BRUISING: Primary | ICD-10-CM

## 2024-05-23 LAB
APTT BLD: 29.7 SEC (ref 22.1–36.4)
BASOPHILS # BLD: 0.1 K/UL (ref 0–0.2)
BASOPHILS NFR BLD: 0.7 %
DEPRECATED RDW RBC AUTO: 15 % (ref 12.4–15.4)
EOSINOPHIL # BLD: 1.1 K/UL (ref 0–0.6)
EOSINOPHIL NFR BLD: 13.6 %
HCT VFR BLD AUTO: 37.1 % (ref 36–48)
HGB BLD-MCNC: 12.6 G/DL (ref 12–16)
INR PPP: 0.94 (ref 0.85–1.15)
LYMPHOCYTES # BLD: 2 K/UL (ref 1–5.1)
LYMPHOCYTES NFR BLD: 24.6 %
MCH RBC QN AUTO: 28.9 PG (ref 26–34)
MCHC RBC AUTO-ENTMCNC: 33.9 G/DL (ref 31–36)
MCV RBC AUTO: 85.3 FL (ref 80–100)
MONOCYTES # BLD: 0.6 K/UL (ref 0–1.3)
MONOCYTES NFR BLD: 7.2 %
NEUTROPHILS # BLD: 4.3 K/UL (ref 1.7–7.7)
NEUTROPHILS NFR BLD: 53.9 %
PLATELET # BLD AUTO: 307 K/UL (ref 135–450)
PMV BLD AUTO: 8.4 FL (ref 5–10.5)
PROTHROMBIN TIME: 12.8 SEC (ref 11.9–14.9)
RBC # BLD AUTO: 4.35 M/UL (ref 4–5.2)
WBC # BLD AUTO: 8 K/UL (ref 4–11)

## 2024-05-23 PROCEDURE — 85730 THROMBOPLASTIN TIME PARTIAL: CPT

## 2024-05-23 PROCEDURE — 36415 COLL VENOUS BLD VENIPUNCTURE: CPT

## 2024-05-23 PROCEDURE — 99214 OFFICE O/P EST MOD 30 MIN: CPT | Performed by: STUDENT IN AN ORGANIZED HEALTH CARE EDUCATION/TRAINING PROGRAM

## 2024-05-23 PROCEDURE — 85025 COMPLETE CBC W/AUTO DIFF WBC: CPT

## 2024-05-23 PROCEDURE — 85610 PROTHROMBIN TIME: CPT

## 2024-05-23 SDOH — ECONOMIC STABILITY: FOOD INSECURITY: WITHIN THE PAST 12 MONTHS, THE FOOD YOU BOUGHT JUST DIDN'T LAST AND YOU DIDN'T HAVE MONEY TO GET MORE.: NEVER TRUE

## 2024-05-23 SDOH — ECONOMIC STABILITY: INCOME INSECURITY: HOW HARD IS IT FOR YOU TO PAY FOR THE VERY BASICS LIKE FOOD, HOUSING, MEDICAL CARE, AND HEATING?: NOT HARD AT ALL

## 2024-05-23 SDOH — ECONOMIC STABILITY: FOOD INSECURITY: WITHIN THE PAST 12 MONTHS, YOU WORRIED THAT YOUR FOOD WOULD RUN OUT BEFORE YOU GOT MONEY TO BUY MORE.: NEVER TRUE

## 2024-05-23 ASSESSMENT — PATIENT HEALTH QUESTIONNAIRE - PHQ9
SUM OF ALL RESPONSES TO PHQ QUESTIONS 1-9: 1
10. IF YOU CHECKED OFF ANY PROBLEMS, HOW DIFFICULT HAVE THESE PROBLEMS MADE IT FOR YOU TO DO YOUR WORK, TAKE CARE OF THINGS AT HOME, OR GET ALONG WITH OTHER PEOPLE: NOT DIFFICULT AT ALL
SUM OF ALL RESPONSES TO PHQ QUESTIONS 1-9: 1
SUM OF ALL RESPONSES TO PHQ QUESTIONS 1-9: 1
1. LITTLE INTEREST OR PLEASURE IN DOING THINGS: NOT AT ALL
SUM OF ALL RESPONSES TO PHQ9 QUESTIONS 1 & 2: 0
SUM OF ALL RESPONSES TO PHQ QUESTIONS 1-9: 1
3. TROUBLE FALLING OR STAYING ASLEEP: NOT AT ALL
8. MOVING OR SPEAKING SO SLOWLY THAT OTHER PEOPLE COULD HAVE NOTICED. OR THE OPPOSITE, BEING SO FIGETY OR RESTLESS THAT YOU HAVE BEEN MOVING AROUND A LOT MORE THAN USUAL: NOT AT ALL
4. FEELING TIRED OR HAVING LITTLE ENERGY: SEVERAL DAYS
6. FEELING BAD ABOUT YOURSELF - OR THAT YOU ARE A FAILURE OR HAVE LET YOURSELF OR YOUR FAMILY DOWN: NOT AT ALL
9. THOUGHTS THAT YOU WOULD BE BETTER OFF DEAD, OR OF HURTING YOURSELF: NOT AT ALL
5. POOR APPETITE OR OVEREATING: NOT AT ALL
7. TROUBLE CONCENTRATING ON THINGS, SUCH AS READING THE NEWSPAPER OR WATCHING TELEVISION: NOT AT ALL
2. FEELING DOWN, DEPRESSED OR HOPELESS: NOT AT ALL

## 2024-05-23 ASSESSMENT — ANXIETY QUESTIONNAIRES
7. FEELING AFRAID AS IF SOMETHING AWFUL MIGHT HAPPEN: NOT AT ALL
IF YOU CHECKED OFF ANY PROBLEMS ON THIS QUESTIONNAIRE, HOW DIFFICULT HAVE THESE PROBLEMS MADE IT FOR YOU TO DO YOUR WORK, TAKE CARE OF THINGS AT HOME, OR GET ALONG WITH OTHER PEOPLE: NOT DIFFICULT AT ALL
1. FEELING NERVOUS, ANXIOUS, OR ON EDGE: NOT AT ALL
4. TROUBLE RELAXING: NOT AT ALL
GAD7 TOTAL SCORE: 0
5. BEING SO RESTLESS THAT IT IS HARD TO SIT STILL: NOT AT ALL
6. BECOMING EASILY ANNOYED OR IRRITABLE: NOT AT ALL
3. WORRYING TOO MUCH ABOUT DIFFERENT THINGS: NOT AT ALL
2. NOT BEING ABLE TO STOP OR CONTROL WORRYING: NOT AT ALL

## 2024-05-23 NOTE — PROGRESS NOTES
5/23/2024    This is a 38 y.o. female who presents for  Chief Complaint   Patient presents with    Toe Pain     Has a big hard ump on the bottom of left Big toe.     Bleeding/Bruising     Patient explains that she has been on an Iron supplement for a long time because of the bruising. She has just been bruising a lot more than normal lately and wants to get advice on the bruising.     exema     She has a history of exemia and has it under control on her body, however she has it on her scalp and she doesn't know what she can use in her hair to help with the exemia.        HPI:     Toe  - on lft big toe on plantar anterior aspect  - cyst like texture  - painful occasionally when stepping on it in a certain way  - grown recently      Bruising  - aalways bruised easily  - has started taking a better iron supplement because she has fel the bruising is worse   - mostly on legs, but also on some of her hands and wrists  - has been on zoloft many years and the bruising has just gotten worse in the last couple months   - no changes in ibuprofen  - no extra OTC supplements    Scalp eczema  - is on dupixant for eczema  - for the last month or 2, scalp has been getting worse  - easrliest appointment with derm is in June  - itching and cauing hair to thin        Past Medical History:   Diagnosis Date    Anxiety 2020    Treated and released, neuropsych center [facility]    Depression 2020    Treated and released, neuropsych center [facility]    Shoulder disorder     right       Past Surgical History:   Procedure Laterality Date    SHOULDER ARTHROSCOPY Right 3/23/2023    RIGHT SHOULDER ARTHROSCOPY, BICEPS TENODESIS, ROTATOR CUFF REPAIR - +BLOCK; performed by Tomas Patino MD at [facility] OR    WISDOM TOOTH EXTRACTION         Social History     Socioeconomic History    Marital status:      Spouse name: Not on file    Number of children: Not on file    Years of education: Not on file    Highest

## 2024-06-12 ENCOUNTER — PATIENT MESSAGE (OUTPATIENT)
Dept: PRIMARY CARE CLINIC | Age: 39
End: 2024-06-12

## 2024-06-13 NOTE — TELEPHONE ENCOUNTER
From: Fatou Alvarez  To: Dr. Christa Ness  Sent: 6/12/2024 9:00 AM EDT  Subject: Toe Surgery    Dr. 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 He has recommended that I have surgery to remove it. Surgery is scheduled for 06/31/2024. I am going to need you to fill out the attached History and Physical document within 30 days of the surgery. Do I need to schedule a visit to see you in the month leading up to the surgery, or are we good?  Thanks,  Fatou

## 2024-07-08 ENCOUNTER — OFFICE VISIT (OUTPATIENT)
Dept: PRIMARY CARE CLINIC | Age: 39
End: 2024-07-08
Payer: COMMERCIAL

## 2024-07-08 VITALS
WEIGHT: 211 LBS | OXYGEN SATURATION: 98 % | BODY MASS INDEX: 30.21 KG/M2 | DIASTOLIC BLOOD PRESSURE: 72 MMHG | TEMPERATURE: 98.4 F | HEART RATE: 68 BPM | HEIGHT: 70 IN | SYSTOLIC BLOOD PRESSURE: 118 MMHG

## 2024-07-08 DIAGNOSIS — Z01.818 PREOP EXAMINATION: Primary | ICD-10-CM

## 2024-07-08 PROCEDURE — 99214 OFFICE O/P EST MOD 30 MIN: CPT | Performed by: STUDENT IN AN ORGANIZED HEALTH CARE EDUCATION/TRAINING PROGRAM

## 2024-07-08 RX ORDER — UPADACITINIB 15 MG/1
1 TABLET, EXTENDED RELEASE ORAL DAILY
COMMUNITY

## 2024-07-08 NOTE — PROGRESS NOTES
Preoperative Consultation        [facility]                                  [address], [address], [place] [address]         Phone: [phone]      Fatou Alvarez  YOB: 1985    Date of Service:  7/8/2024    There were no vitals filed for this visit.   Wt Readings from Last 2 Encounters:   05/23/24 93.9 kg (207 lb)   11/10/23 94.3 kg (208 lb)     BP Readings from Last 3 Encounters:   05/23/24 126/76   11/10/23 114/76   10/05/23 116/70        No chief complaint on file.    Allergies   Allergen Reactions    Latex Itching     rash    Codeine Nausea And Vomiting    Gluten Meal Rash     Digestive problems and eczema     No outpatient medications have been marked as taking for the 7/8/24 encounter (Appointment) with Gena Wick MD.       This patient presents to the office today for a preoperative consultation at the request of surgeon,  ***, who plans on performing *** on {Time; month:10108} {Numbers; 0-31:80432} at {[facility]:19898}.  The current problem began {NUMBERS 1-12:10} {DAY, DAYS, WEEK, WEEKS, MONTH, MONTHS, YEAR, YEARS:08925} ago, and symptoms have been {Desc; course:72689} with time.  Conservative therapy: {desc; effective/ineffective:95220}.    Planned anesthesia: {Procedures; anesthesia:812}   Known anesthesia problems: {ANESTHESIA PROBLEMS:20006}   Bleeding risk: {BLEEDING RISK:78187}  Personal or FH of DVT/PE: {NO/YES:[id number]::\"No\"}    Patient objection to receiving blood products: {NO/YES:[id number]::\"No\"}    Patient Active Problem List   Diagnosis    Celiac disease    PTSD (post-traumatic stress disorder)    Recurrent major depressive disorder, in full remission (HCC)    Rotator cuff disorder, right    Incomplete tear of rotator cuff    Pain in joint involving pelvic region and thigh       Past Medical History:   Diagnosis Date    Anxiety 2020    Treated and released, neuropsych 
holding recommendations.     Prophylaxis for cardiac events with   A)  perioperative beta-blockers: Not indicated  B)  perioperative statins: not indicated    Deep vein thrombosis prophylaxis: regimen to be chosen by surgical team        Known risk factors for perioperative complications: None    1. Preop examination  - Rinvoq plan per above        As outlined above, measures were taken to assess risk, and decrease risk when possible.  Risks of surgery were discussed with patient, and benefits believed to outweigh risks.  Patient is making an informed decision to proceed with surgery with an understanding of any risk,  Please follow all pre-op recommendations above.

## 2024-07-08 NOTE — H&P (VIEW-ONLY)
holding recommendations.     Prophylaxis for cardiac events with   A)  perioperative beta-blockers: Not indicated  B)  perioperative statins: not indicated    Deep vein thrombosis prophylaxis: regimen to be chosen by surgical team        Known risk factors for perioperative complications: None    1. Preop examination  - Rinvoq plan per above        As outlined above, measures were taken to assess risk, and decrease risk when possible.  Risks of surgery were discussed with patient, and benefits believed to outweigh risks.  Patient is making an informed decision to proceed with surgery with an understanding of any risk,  Please follow all pre-op recommendations above.

## 2024-07-30 ENCOUNTER — ANESTHESIA EVENT (OUTPATIENT)
Dept: OPERATING ROOM | Age: 39
End: 2024-07-30
Payer: COMMERCIAL

## 2024-07-31 ENCOUNTER — ANESTHESIA (OUTPATIENT)
Dept: OPERATING ROOM | Age: 39
End: 2024-07-31
Payer: COMMERCIAL

## 2024-07-31 ENCOUNTER — HOSPITAL ENCOUNTER (OUTPATIENT)
Age: 39
Setting detail: OUTPATIENT SURGERY
Discharge: HOME | End: 2024-07-31
Attending: PODIATRIST | Admitting: PODIATRIST
Payer: COMMERCIAL

## 2024-07-31 VITALS
HEIGHT: 70 IN | DIASTOLIC BLOOD PRESSURE: 72 MMHG | OXYGEN SATURATION: 99 % | HEART RATE: 51 BPM | RESPIRATION RATE: 16 BRPM | TEMPERATURE: 97.8 F | WEIGHT: 200 LBS | BODY MASS INDEX: 28.63 KG/M2 | SYSTOLIC BLOOD PRESSURE: 109 MMHG

## 2024-07-31 DIAGNOSIS — D23.70: ICD-10-CM

## 2024-07-31 LAB — HCG UR QL: NEGATIVE

## 2024-07-31 PROCEDURE — 7100000010 HC PHASE II RECOVERY - FIRST 15 MIN: Performed by: PODIATRIST

## 2024-07-31 PROCEDURE — 6360000002 HC RX W HCPCS: Performed by: ANESTHESIOLOGY

## 2024-07-31 PROCEDURE — 2580000003 HC RX 258: Performed by: ANESTHESIOLOGY

## 2024-07-31 PROCEDURE — 88307 TISSUE EXAM BY PATHOLOGIST: CPT

## 2024-07-31 PROCEDURE — 84703 CHORIONIC GONADOTROPIN ASSAY: CPT

## 2024-07-31 PROCEDURE — 3600000012 HC SURGERY LEVEL 2 ADDTL 15MIN: Performed by: PODIATRIST

## 2024-07-31 PROCEDURE — 6360000002 HC RX W HCPCS: Performed by: PODIATRIST

## 2024-07-31 PROCEDURE — 3600000002 HC SURGERY LEVEL 2 BASE: Performed by: PODIATRIST

## 2024-07-31 PROCEDURE — 7100000011 HC PHASE II RECOVERY - ADDTL 15 MIN: Performed by: PODIATRIST

## 2024-07-31 PROCEDURE — 6360000002 HC RX W HCPCS: Performed by: NURSE ANESTHETIST, CERTIFIED REGISTERED

## 2024-07-31 PROCEDURE — 2709999900 HC NON-CHARGEABLE SUPPLY: Performed by: PODIATRIST

## 2024-07-31 PROCEDURE — 3700000000 HC ANESTHESIA ATTENDED CARE: Performed by: PODIATRIST

## 2024-07-31 PROCEDURE — 3700000001 HC ADD 15 MINUTES (ANESTHESIA): Performed by: PODIATRIST

## 2024-07-31 RX ORDER — SODIUM CHLORIDE 0.9 % (FLUSH) 0.9 %
5-40 SYRINGE (ML) INJECTION EVERY 12 HOURS SCHEDULED
Status: CANCELLED | OUTPATIENT
Start: 2024-07-31

## 2024-07-31 RX ORDER — DIPHENHYDRAMINE HYDROCHLORIDE 50 MG/ML
12.5 INJECTION INTRAMUSCULAR; INTRAVENOUS
Status: CANCELLED | OUTPATIENT
Start: 2024-07-31 | End: 2024-08-01

## 2024-07-31 RX ORDER — CEFAZOLIN SODIUM IN 0.9 % NACL 2 G/100 ML
2000 PLASTIC BAG, INJECTION (ML) INTRAVENOUS
Status: COMPLETED | OUTPATIENT
Start: 2024-07-31 | End: 2024-07-31

## 2024-07-31 RX ORDER — NALOXONE HYDROCHLORIDE 0.4 MG/ML
INJECTION, SOLUTION INTRAMUSCULAR; INTRAVENOUS; SUBCUTANEOUS PRN
Status: CANCELLED | OUTPATIENT
Start: 2024-07-31

## 2024-07-31 RX ORDER — FENTANYL CITRATE 50 UG/ML
INJECTION, SOLUTION INTRAMUSCULAR; INTRAVENOUS PRN
Status: DISCONTINUED | OUTPATIENT
Start: 2024-07-31 | End: 2024-07-31 | Stop reason: SDUPTHER

## 2024-07-31 RX ORDER — PROPOFOL 10 MG/ML
INJECTION, EMULSION INTRAVENOUS CONTINUOUS PRN
Status: DISCONTINUED | OUTPATIENT
Start: 2024-07-31 | End: 2024-07-31 | Stop reason: SDUPTHER

## 2024-07-31 RX ORDER — SODIUM CHLORIDE 0.9 % (FLUSH) 0.9 %
5-40 SYRINGE (ML) INJECTION PRN
Status: DISCONTINUED | OUTPATIENT
Start: 2024-07-31 | End: 2024-07-31 | Stop reason: HOSPADM

## 2024-07-31 RX ORDER — SODIUM CHLORIDE 0.9 % (FLUSH) 0.9 %
5-40 SYRINGE (ML) INJECTION PRN
Status: CANCELLED | OUTPATIENT
Start: 2024-07-31

## 2024-07-31 RX ORDER — SODIUM CHLORIDE 0.9 % (FLUSH) 0.9 %
5-40 SYRINGE (ML) INJECTION EVERY 12 HOURS SCHEDULED
Status: DISCONTINUED | OUTPATIENT
Start: 2024-07-31 | End: 2024-07-31 | Stop reason: HOSPADM

## 2024-07-31 RX ORDER — SODIUM CHLORIDE 9 MG/ML
INJECTION, SOLUTION INTRAVENOUS PRN
Status: CANCELLED | OUTPATIENT
Start: 2024-07-31

## 2024-07-31 RX ORDER — SODIUM CHLORIDE 9 MG/ML
INJECTION, SOLUTION INTRAVENOUS PRN
Status: DISCONTINUED | OUTPATIENT
Start: 2024-07-31 | End: 2024-07-31 | Stop reason: HOSPADM

## 2024-07-31 RX ORDER — SODIUM CHLORIDE, SODIUM LACTATE, POTASSIUM CHLORIDE, CALCIUM CHLORIDE 600; 310; 30; 20 MG/100ML; MG/100ML; MG/100ML; MG/100ML
INJECTION, SOLUTION INTRAVENOUS CONTINUOUS
Status: DISCONTINUED | OUTPATIENT
Start: 2024-07-31 | End: 2024-07-31 | Stop reason: HOSPADM

## 2024-07-31 RX ORDER — OXYCODONE HYDROCHLORIDE 5 MG/1
10 TABLET ORAL PRN
Status: CANCELLED | OUTPATIENT
Start: 2024-07-31 | End: 2024-07-31

## 2024-07-31 RX ORDER — MIDAZOLAM HYDROCHLORIDE 1 MG/ML
2 INJECTION INTRAMUSCULAR; INTRAVENOUS
Status: COMPLETED | OUTPATIENT
Start: 2024-07-31 | End: 2024-07-31

## 2024-07-31 RX ORDER — LIDOCAINE HYDROCHLORIDE 10 MG/ML
1 INJECTION, SOLUTION EPIDURAL; INFILTRATION; INTRACAUDAL; PERINEURAL
Status: DISCONTINUED | OUTPATIENT
Start: 2024-07-31 | End: 2024-07-31 | Stop reason: HOSPADM

## 2024-07-31 RX ORDER — BUPIVACAINE HYDROCHLORIDE 5 MG/ML
INJECTION, SOLUTION EPIDURAL; INTRACAUDAL PRN
Status: DISCONTINUED | OUTPATIENT
Start: 2024-07-31 | End: 2024-07-31 | Stop reason: ALTCHOICE

## 2024-07-31 RX ORDER — OXYCODONE HYDROCHLORIDE 5 MG/1
5 TABLET ORAL PRN
Status: CANCELLED | OUTPATIENT
Start: 2024-07-31 | End: 2024-07-31

## 2024-07-31 RX ORDER — MEPERIDINE HYDROCHLORIDE 50 MG/ML
12.5 INJECTION INTRAMUSCULAR; INTRAVENOUS; SUBCUTANEOUS EVERY 5 MIN PRN
Status: CANCELLED | OUTPATIENT
Start: 2024-07-31

## 2024-07-31 RX ORDER — ONDANSETRON 2 MG/ML
4 INJECTION INTRAMUSCULAR; INTRAVENOUS
Status: CANCELLED | OUTPATIENT
Start: 2024-07-31

## 2024-07-31 RX ORDER — LABETALOL HYDROCHLORIDE 5 MG/ML
5 INJECTION, SOLUTION INTRAVENOUS EVERY 10 MIN PRN
Status: CANCELLED | OUTPATIENT
Start: 2024-07-31

## 2024-07-31 RX ADMIN — FENTANYL CITRATE 50 MCG: 50 INJECTION, SOLUTION INTRAMUSCULAR; INTRAVENOUS at 07:32

## 2024-07-31 RX ADMIN — Medication 2000 MG: at 07:22

## 2024-07-31 RX ADMIN — SODIUM CHLORIDE, SODIUM LACTATE, POTASSIUM CHLORIDE, AND CALCIUM CHLORIDE: .6; .31; .03; .02 INJECTION, SOLUTION INTRAVENOUS at 07:19

## 2024-07-31 RX ADMIN — MIDAZOLAM 2 MG: 1 INJECTION INTRAMUSCULAR; INTRAVENOUS at 07:17

## 2024-07-31 RX ADMIN — PROPOFOL 150 MCG/KG/MIN: 10 INJECTION, EMULSION INTRAVENOUS at 07:22

## 2024-07-31 RX ADMIN — FENTANYL CITRATE 50 MCG: 50 INJECTION, SOLUTION INTRAMUSCULAR; INTRAVENOUS at 07:22

## 2024-07-31 ASSESSMENT — PAIN - FUNCTIONAL ASSESSMENT
PAIN_FUNCTIONAL_ASSESSMENT: 0-10
PAIN_FUNCTIONAL_ASSESSMENT: 0-10

## 2024-07-31 NOTE — INTERVAL H&P NOTE
Update History & Physical    The patient's History and Physical of July 8, 2024 was reviewed with the patient and I examined the patient. There was no change. The surgical site was confirmed by the patient and me.     Plan: The risks, benefits, expected outcome, and alternative to the recommended procedure have been discussed with the patient. Patient understands and wants to proceed with the procedure.     Electronically signed by Jarad Bhatia DPM on 7/31/2024 at 7:06 AM

## 2024-07-31 NOTE — BRIEF OP NOTE
Brief Postoperative Note      Patient: Fatou Alvarez  YOB: 1985  MRN: [id number]    Date of Procedure: 7/31/2024    Preop Diagnosis:  Benign soft tissue mass of the left lower limb    Post-Op Diagnosis: Same       Procedure(s):  EXCISION BENIGN SOFT TISSUE NEOPLASM LEFT GREAT TOE               **LATEX SENSITIVE**    Surgeon(s):  Jarad Bhatia DPM    Assistant:  Surgical Assistant: Vivien Root    Anesthesia: Monitor Anesthesia Care    Estimated Blood Loss (mL): Minimal    Complications: None    Specimens:   ID Type Source Tests Collected by Time Destination   A : SOFT TISSUE MASS LEFT GREAT TOE Specimen Toe SURGICAL PATHOLOGY Jarad Bhatia DPM 7/31/2024 0738        Implants:  * No implants in log *      Drains: * No LDAs found *    Findings:    Other Findings: Well encapsulated firm soft tissue mass approximately 5 mm in diameter  This procedure was not performed to treat primary cutaneous melanoma through wide local excision    Electronically signed by Jarad Bhatia DPM on 7/31/2024 at 7:50 AM

## 2024-07-31 NOTE — ANESTHESIA PRE PROCEDURE
Department of Anesthesiology  Preprocedure Note       Name:  Fatou Alvarez   Age:  38 y.o.  :  1985                                          MRN:  [id number]         Date:  2024      Surgeon: Surgeon(s):  Jarad Bhatia DPM    Procedure: Procedure(s):  EXCISION BENIGN SOFT TISSUE NEOPLASM LEFT GREAT TOE               **LATEX SENSITIVE**    Medications prior to admission:   Prior to Admission medications    Medication Sig Start Date End Date Taking? Authorizing Provider   Upadacitinib ER (RINVOQ) 15 MG TB24 Take 1 tablet by mouth daily    ProviderMindy MD   augmented betamethasone dipropionate (DIPROLENE-AF) 0.05 % cream APPLY TOPICALLY 2 TIMES A DAY  Patient taking differently: APPLY TOPICALLY 2 TIMES A DAY PRN 24   Christa Ness MD   ferrous sulfate 220 (44 Fe) MG/5ML solution Take by mouth    Mindy Beebe MD   sertraline (ZOLOFT) 50 MG tablet Take 1 tablet by mouth daily 23   Mindy Beebe MD   cetirizine (ZYRTEC) 10 MG tablet Take 1 tablet by mouth daily    ProviderMindy MD       Current medications:    Current Facility-Administered Medications   Medication Dose Route Frequency Provider Last Rate Last Admin   • ceFAZolin (ANCEF) 2000 mg in 0.9% sodium chloride 100 mL IVPB  2,000 mg IntraVENous On Call to OR Jarad Bhatia DPM       • lidocaine PF 1 % injection 1 mL  1 mL IntraDERmal Once PRN Nasir Cadena MD       • lactated ringers IV soln infusion   IntraVENous Continuous Nasir Cadena MD       • sodium chloride flush 0.9 % injection 5-40 mL  5-40 mL IntraVENous 2 times per day Nasir Cadena MD       • sodium chloride flush 0.9 % injection 5-40 mL  5-40 mL IntraVENous PRN Nasir Cadena MD       • 0.9 % sodium chloride infusion   IntraVENous PRN Nasir Cadena MD       • midazolam (VERSED) injection 2 mg  2 mg IntraVENous Once PRN Nasir Cadena MD           Allergies:

## 2024-07-31 NOTE — PROGRESS NOTES
Dc inst reviewed with pt and isabelle, verb understanding.  Pt given walking shoe.  Getting dressed for dc  
Fatou Alvarez    Age 38 y.o.    female    1985    MRN [id number]    7/31/2024  Arrival Time_____________  OR Time____________70 Min     Procedure(s):  EXCISION BENIGN SOFT TISSUE NEOPLASM LEFT GREAT TOE               **LATEX SENSITIVE**                      Monitor Anesthesia Care   Surgeon(s):  Jarad Bhatia, DPM      DAY ADMIT ___  SDS/OP ___  OUTPT IN BED ___        Phone [phone] (home)                  PCP _____________________ Phone_________________ Epic ( ) Epic CE ( ) Appt ________    NOTES: _________________________________________ Consult/Cardio _______________    ____________________________________________________________________________    ____________________________________________________________________________  PAT APPT DATE:________ TIME: ________  FAXED QAD: _______  (__) H&P w/ Hospitalist    (__) PAT orders in EPIC    (__) Meet with PAT nurse  __________________________________________________________________________  Preop Nurse phone screen complete: _____________  (__) CBC     (__) W/ DIFF ___________  (__) CT CHEST  __________   (__) Hgb A1C    ___________  (__) CHEST X RAY   __________  (__) LIPID PROFILE  ___________  (__) EKG   __________  (__) PT-INR / APTT  ___________  (__) PFT's   __________  (__) BMP   ___________  (__) CAROTIDS  __________  (__) CMP   ___________  (__) VEIN MAPPING  __________  (__) U/A   ___________  ( X ) HISTORY & PHYSICAL __________  (__) URINE C & S  ___________  (__) CARDIAC CLEARANCE __________  (__) U/A W/ FLEX  ___________  (__) PULM. CLEARANCE __________  (__) SERUM PREGNANCY ___________  (__) Preop Orders in EPIC __________  (__) TYPE & SCREEN __________repeat ( ) (__)  __________________ __________  (__) Albumin   ___________  (__)  __________________ __________  (__) TRANSFERRIN  ___________  (__)  __________________ __________  (__) LIVER PROFILE  ___________  (__) URINE PREG DOS __________  (__) MRSA NASAL SWAB ___________  (__) BLOOD 
Patient admitted to [facility] in preparation for surgery, VSS. Consent confirmed. IV inserted into right arm, LR infusing. Belongings in room. NPO since 1900. Family at bedside, phone number in system for text updates, call light within reach.    
Preoperative Screening for Elective Surgery/Invasive Procedures While COVID-19 present in the community    Have you had any of the following symptoms?  Fever, chills  Cough  Shortness of breath  Muscle aches/pain  Diarrhea  Abdominal pain, nausea, vomiting  Loss or decrease in taste and / or smell  Risk of Exposure  Have you recently been hospitalized for COVID-19 or flu-like illness, if so when?  Recently diagnosed with COVID-19, if so when?  Recently tested for COVID-19, if so when?  Have you been in close contact with a person or family member who currently has or recently had COVID-19? If yes, when and in what context?  Do you live with anybody who in the last 14 days has had fever, chills, shortness of breath, muscle aches, flu-like illness?  Do you have any close contacts or family members who are currently in the hospital for COVID-19 or flu-like illness? If yes, assess recent close contact with this person.    Indicate if the patient has a positive screen by answering yes to one or more of the above questions.  Patients who test positive or screen positive prior to surgery or on the day of surgery should be evaluated in conjunction with the surgeon/proceduralist/anesthesiologist to determine the urgency of the procedure.    Pt states is symptom free and denies covid exposure  
the child is discharged.  Please do not bring other                children with you.    -Please wear simple, loose-fitting clothing to the hospital. Do not bring valuables (money, credit cards, checkbooks, etc.) Do not wear any makeup (including                no eye makeup) and no nail polish if applicable.             - DO NOT wear any jewelry or body piercings day of surgery.  All body piercing jewelry must be removed.             - If you have dentures they will be removed before going to the OR; we will provide a container.  If you wear contact lenses or glasses, they will be removed,               bring a case for them or wear glasses day of surgery.             - Please see your family doctor/pediatrician for a Preop History & Physical and/or concerning medications within 30 days of the surgery date.                  Non-[facility] physicians can fax H&P/test results to [phone]. You may need cardiac clearance if you see a cardiologist, check with PCP or surgeon.              -If you have a Living Will and Durable Power of  for Healthcare, please bring in a copy to be scanned at registration.              -Notify your Surgeon if you develop any illness between now and the surgery date, cough, cold, fever, sore throat, nausea, vomiting, etc.  Please notify your                surgeon if you experience dizziness, shortness of breath or blurred vision between now & the time of your surgery.              -DO NOT shave your operative site less than 4 days prior to surgery. For face & neck surgery, men may use an electric razor up to 2 days prior to surgery.   -To help prevent infection, change your sheets the night before surgery. Also, shower the night before & morning of surgery using an antibacterial     soap (Dial or Safeguard) or Hibiclens soap as instructed by your surgeon. Do not apply lotion after shower or day of surgery.              -To provide excellent care visitors will be limited to two per

## 2024-07-31 NOTE — OP NOTE
[address] [address]                            OPERATIVE REPORT      PATIENT NAME: DINESH MELARA           : 1985  MED REC NO: [id number]                      ROOM: [facility]  ACCOUNT NO: [id number]                       ADMIT DATE: 2024  PROVIDER: Jarad Bhatia DPM      DATE OF PROCEDURE:  2024    SURGEON:  Jarad Bhatia DPM    ASSISTANT:  None.    PREOPERATIVE DIAGNOSIS:  Benign, soft tissue mass, left great toe.    POSTOPERATIVE DIAGNOSIS:  Benign, soft tissue mass, left great toe, pending pathology.    PATHOLOGY:  5 mm well-encapsulated soft tissue mass.    ANESTHESIA:  Monitored anesthesia care with local infiltration of 8 mL of 0.5% Marcaine plain performed to the left foot in a Philip block pattern.    ESTIMATED BLOOD LOSS:  Minimal.    MATERIALS:  4-0 nylon.    INJECTABLES:  None.    CONDITION:  Stable.    DESCRIPTION OF PROCEDURE IN DETAIL:  The patient was brought to the operating room, placed in supine position on the operating table.  Upon adequate sedation per Anesthesia Department, local infiltration of 8 mL of 0.5% Marcaine plain was performed to the left foot in a Philip block pattern.  At this time, a well-padded pneumatic ankle tourniquet was applied and the left foot was prepped and draped in the usual sterile fashion.  Following the exsanguination with an Esmarch bandage, the tourniquet was raised to 250 mmHg.  Attention was directed to the distal aspect of the left great toe, where a subdermal soft tissue mass protruding was appreciated.  A sagittal incision was performed overlying.  The incision site was anatomically deepened down to the level of the mass, which was subdermal in nature.  It was found to be well encapsulated and measured approximately 5 mm in diameter.  This was passed to the scrub nurse and sent for pathologic evaluation.  The area was then flushed with copious amounts of normal

## 2024-07-31 NOTE — DISCHARGE INSTRUCTIONS
for 1 month after your procedure to prevent unwanted pregnancy.    The following instructions are to be followed if you have a known history or diagnosis of sleep apnea:  For all sleep apnea patients:  ? Sleep on your side or sitting up in a chair whenever possible, especially the first 24 hours after surgery.  ? Use only medicines prescribed by your doctor.    ? Do not drink alcohol.  ? If you have a dental device to assist you while at rest, use it at all times for the first 24 hours.  For patients using CPAP machines:  ? Use your CPAP machine during all periods of sleep as usual.  ? Use your CPAP machine during all periods of daytime rest while on pain medicines.  ** Follow up with your primary care doctor for continued care.    IF YOU DO NOT TAKE ALL OF YOUR NARCOTIC PAIN MEDICATION, please dispose of them responsibly. There are drop off boxes in the Emergency Departments 24/7 at both [facility] and [place]. If these locations are not convenient, other options for discarding them can be found at:  [email or web address]    Hospital or office staff may NOT accept any medications to drop off in the cabinet for you.

## 2024-08-01 NOTE — ANESTHESIA POSTPROCEDURE EVALUATION
Department of Anesthesiology  Postprocedure Note    Patient: Fatou Alvarez  MRN: [id number]  YOB: 1985  Date of evaluation: 8/1/2024    Procedure Summary       Date: 07/31/24 Room / Location: [address]    Anesthesia Start: 0719 Anesthesia Stop: 0750    Procedure: EXCISION BENIGN SOFT TISSUE NEOPLASM LEFT GREAT TOE (Left: First Toe) Diagnosis:       Benign neoplasm of skin of toe      (Benign neoplasm of skin of toe [D23.70])    Surgeons: Jaard Bhatia DPM Responsible Provider: Iam Stock MD    Anesthesia Type: MAC ASA Status: 2            Anesthesia Type: No value filed.    Heydi Phase I: Heydi Score: 10    Heydi Phase II: Heydi Score: 10    Anesthesia Post Evaluation    Patient location during evaluation: PACU  Patient participation: complete - patient participated  Level of consciousness: awake and alert  Pain score: 0  Airway patency: patent  Nausea & Vomiting: no nausea and no vomiting  Cardiovascular status: blood pressure returned to baseline  Respiratory status: acceptable  Hydration status: stable  Pain management: adequate    No notable events documented.

## 2024-08-22 DIAGNOSIS — L20.82 FLEXURAL ECZEMA: ICD-10-CM

## 2024-08-22 RX ORDER — BETAMETHASONE DIPROPIONATE 0.5 MG/G
CREAM TOPICAL
Qty: 50 G | Refills: 0 | Status: SHIPPED | OUTPATIENT
Start: 2024-08-22

## 2024-08-22 NOTE — TELEPHONE ENCOUNTER
Refill Request     CONFIRM preferred pharmacy with the patient.    If Mail Order Rx - Pend for 90 day refill.      Last Seen: Last Seen Department: 6/19/2023  Last Seen by PCP: 5/23/2024    Last Written: 2/6/24 50 with no refills     If no future appointment scheduled:  Review the last OV with PCP and review information for follow-up visit,  Route STAFF MESSAGE with patient name to the  Pool for scheduling with the following information:            -  Timing of next visit           -  Visit type ie Physical, OV, etc           -  Diagnoses/Reason ie. COPD, HTN - Do not use MEDICATION, Follow-up or CHECK UP - Give reason for visit      Next Appointment:   Future Appointments   Date Time Provider Department Center   9/5/2024  2:00 PM [facility]   11/12/2024  3:30 PM Christa Ness MD [facility]       Message sent to  to schedule appt with patient?  NO      Requested Prescriptions     Pending Prescriptions Disp Refills    augmented betamethasone dipropionate (DIPROLENE-AF) 0.05 % cream [Pharmacy Med Name: Betamethasone Dipropionate Aug External Cream 0.05 %] 50 g 0     Sig: APPLY TOPICALLY 2 TIMES A DAY

## 2024-09-05 ENCOUNTER — OFFICE VISIT (OUTPATIENT)
Dept: PRIMARY CARE CLINIC | Age: 39
End: 2024-09-05
Payer: COMMERCIAL

## 2024-09-05 VITALS
OXYGEN SATURATION: 98 % | DIASTOLIC BLOOD PRESSURE: 74 MMHG | HEART RATE: 79 BPM | WEIGHT: 215.8 LBS | BODY MASS INDEX: 30.96 KG/M2 | TEMPERATURE: 98.2 F | SYSTOLIC BLOOD PRESSURE: 116 MMHG

## 2024-09-05 DIAGNOSIS — E66.9 CLASS 1 OBESITY WITH BODY MASS INDEX (BMI) OF 30.0 TO 30.9 IN ADULT, UNSPECIFIED OBESITY TYPE, UNSPECIFIED WHETHER SERIOUS COMORBIDITY PRESENT: Primary | ICD-10-CM

## 2024-09-05 DIAGNOSIS — R06.83 SNORING: ICD-10-CM

## 2024-09-05 PROCEDURE — 99213 OFFICE O/P EST LOW 20 MIN: CPT | Performed by: STUDENT IN AN ORGANIZED HEALTH CARE EDUCATION/TRAINING PROGRAM

## 2024-09-05 NOTE — PATIENT INSTRUCTIONS
Please visit to apply for coupon for Zep[employer]    [email or web address]

## 2024-09-05 NOTE — PROGRESS NOTES
inadvertently transcribed.  Although I have reviewed the note for such errors, some may still exist.

## 2024-09-10 ENCOUNTER — TELEPHONE (OUTPATIENT)
Dept: PRIMARY CARE CLINIC | Age: 39
End: 2024-09-10

## 2024-10-23 ASSESSMENT — ENCOUNTER SYMPTOMS
ABDOMINAL PAIN: 0
SHORTNESS OF BREATH: 0
CONSTIPATION: 0
NAUSEA: 0

## 2024-10-23 NOTE — PROGRESS NOTES
PROGRESS NOTE   [facility]                                  [address], [address], [facility] [address]         Phone: [phone]    Date of Service:  10/24/2024     Patient ID: Kelechi Alvarez is a 38 y.o. female      Subjective:     CC: Patient is a 38-year-old female with PMHx significant for celiac's disease, PTSD, recurrent major depressive disorder who is here for her 1 month follow-up    HPI    Weight management  Patient reports starting compounded semaglutide from [facility].  She states that she feels there is a mixed bag being.  She is thinking about food last and is less likely to grab something to snack on kids or eating.  She feels she has not really lost any weight yet but that it has potential work and higher dosages.  She reports she previously was in defecate once or twice a day and is now going every 2 to 3 days.  Overall she feels that things are slower.  She reports that she is maintaining adequate hydration but she feels that the medication causes her not to desire food or water.      Snoring  Patient reports no change in her snoring as far she is aware.  She says her partner is usually the one that reports this.  She knows that when she weighs more she tends to snore more.  She reports a family history of sleep apnea but feels that this is not her current issue.    ROS:    Review of Systems   Constitutional:  Negative for fatigue and fever.   Respiratory:  Negative for shortness of breath.    Cardiovascular:  Negative for palpitations and leg swelling.   Gastrointestinal:  Negative for abdominal pain, constipation and nausea.        Bowel movements changed from 1-2 times daily to once every 2 to 3 days     Neurological:  Negative for dizziness and light-headedness.   Psychiatric/Behavioral:  Negative for sleep disturbance.      Vitals:    10/24/24 1608   BP: 116/72   Site: Left Upper Arm   Position:

## 2024-10-24 ENCOUNTER — OFFICE VISIT (OUTPATIENT)
Dept: PRIMARY CARE CLINIC | Age: 39
End: 2024-10-24

## 2024-10-24 VITALS
HEART RATE: 71 BPM | HEIGHT: 70 IN | WEIGHT: 217 LBS | SYSTOLIC BLOOD PRESSURE: 116 MMHG | BODY MASS INDEX: 31.07 KG/M2 | DIASTOLIC BLOOD PRESSURE: 72 MMHG | OXYGEN SATURATION: 98 %

## 2024-10-24 DIAGNOSIS — R06.83 SNORING: ICD-10-CM

## 2024-10-24 DIAGNOSIS — E66.811 CLASS 1 OBESITY WITH BODY MASS INDEX (BMI) OF 30.0 TO 30.9 IN ADULT, UNSPECIFIED OBESITY TYPE, UNSPECIFIED WHETHER SERIOUS COMORBIDITY PRESENT: Primary | ICD-10-CM

## 2024-10-24 DIAGNOSIS — Z00.00 HEALTHCARE MAINTENANCE: ICD-10-CM

## 2024-10-25 ENCOUNTER — PATIENT MESSAGE (OUTPATIENT)
Dept: PRIMARY CARE CLINIC | Age: 39
End: 2024-10-25

## 2024-11-12 ENCOUNTER — OFFICE VISIT (OUTPATIENT)
Dept: PRIMARY CARE CLINIC | Age: 39
End: 2024-11-12

## 2024-11-12 VITALS
WEIGHT: 212 LBS | DIASTOLIC BLOOD PRESSURE: 72 MMHG | SYSTOLIC BLOOD PRESSURE: 104 MMHG | BODY MASS INDEX: 30.42 KG/M2 | OXYGEN SATURATION: 100 % | HEART RATE: 60 BPM

## 2024-11-12 DIAGNOSIS — Z11.59 NEED FOR HEPATITIS B SCREENING TEST: ICD-10-CM

## 2024-11-12 DIAGNOSIS — K90.0 CELIAC DISEASE: ICD-10-CM

## 2024-11-12 DIAGNOSIS — F33.42 RECURRENT MAJOR DEPRESSIVE DISORDER, IN FULL REMISSION (HCC): ICD-10-CM

## 2024-11-12 DIAGNOSIS — Z00.00 ANNUAL PHYSICAL EXAM: Primary | ICD-10-CM

## 2024-11-12 DIAGNOSIS — Z00.00 ENCOUNTER FOR WELL ADULT EXAM WITHOUT ABNORMAL FINDINGS: ICD-10-CM

## 2024-11-12 NOTE — PROGRESS NOTES
Well Adult Note  Name: Fatou Alvarez Date of encounter: 2024   MRN: [id number] Sex: Female   Age: 38 y.o. Ethnicity: Non- / Non    : 1985 Race: White (non-)      Fatou Alvarez is here for a well adult exam.       Assessment & Plan     1.  Annual physical exam.  Chart reviewed, records updated.  Patient defers flu shot today.  Will check the following labs for surveillance: CBC, BMP, lipid panel, will also check hepatitis B surface antibody since patient does not have previous record of vaccination.  Will follow-up results with patient.  2.  Celiac disease.  Controlled, will check CBC and BMP and lipid panel for surveillance.  3. MDD. Controlled, continue Zoloft.         Return in about 6 months (around 2025) for Med Check.       Subjective   History:  Patient is here today for annual physical. Concerns or other issues noted below:    No new issues or concerns.   Defers flu shot.        For females  Patient's last menstrual period was 2024.  menstrual cycle: normal   Sexual activity: single partner, contraception - vasectomy and no barrier protection, not interested in STD testing    AbnormalSxs: no    Last PAP: 2023     Last Mammogram: no  Family Hx of ovarian, breast, or uterine cancer: no  Self-breast exams: yes     Previous Colonoscopy no  BRBR, unexplained WL, bloating, abd pain No  Family Hx of Colon Ca  no    Exercise: weight training  4 time(s) per week, also cardio once per week   Diet: improving; started semaglutide recently, working to get more protein and vegetables in her diet;             Allergies   Allergen Reactions    Latex Itching     rash    Codeine Nausea And Vomiting and Anaphylaxis    Gluten Meal Rash     Prior to Visit Medications    Medication Sig Taking? Authorizing Provider   Semaglutide,0.25 or 0.5MG/DOS, 2 MG/1.5ML SOPN Inject 0.5 mg into the skin once a week Yes Juan Colon,    augmented betamethasone dipropionate

## 2025-01-02 DIAGNOSIS — Z11.59 NEED FOR HEPATITIS B SCREENING TEST: ICD-10-CM

## 2025-01-02 DIAGNOSIS — Z00.00 ANNUAL PHYSICAL EXAM: ICD-10-CM

## 2025-01-02 DIAGNOSIS — K90.0 CELIAC DISEASE: ICD-10-CM

## 2025-01-02 LAB
ANION GAP SERPL CALCULATED.3IONS-SCNC: 9 MMOL/L (ref 3–16)
BASOPHILS # BLD: 0 K/UL (ref 0–0.2)
BASOPHILS NFR BLD: 0.4 %
BUN SERPL-MCNC: 13 MG/DL (ref 7–20)
CALCIUM SERPL-MCNC: 8.7 MG/DL (ref 8.3–10.6)
CHLORIDE SERPL-SCNC: 106 MMOL/L (ref 99–110)
CHOLEST SERPL-MCNC: 167 MG/DL (ref 0–199)
CO2 SERPL-SCNC: 24 MMOL/L (ref 21–32)
CREAT SERPL-MCNC: 0.7 MG/DL (ref 0.6–1.1)
DEPRECATED RDW RBC AUTO: 14.2 % (ref 12.4–15.4)
EOSINOPHIL # BLD: 0.1 K/UL (ref 0–0.6)
EOSINOPHIL NFR BLD: 3.1 %
GFR SERPLBLD CREATININE-BSD FMLA CKD-EPI: >90 ML/MIN/{1.73_M2}
GLUCOSE SERPL-MCNC: 76 MG/DL (ref 70–99)
HBV SURFACE AB SERPL IA-ACNC: <3.5 MIU/ML
HCT VFR BLD AUTO: 35.3 % (ref 36–48)
HDLC SERPL-MCNC: 46 MG/DL (ref 40–60)
HGB BLD-MCNC: 11.8 G/DL (ref 12–16)
LDL CHOLESTEROL: 111 MG/DL
LYMPHOCYTES # BLD: 1.9 K/UL (ref 1–5.1)
LYMPHOCYTES NFR BLD: 42.1 %
MCH RBC QN AUTO: 30.1 PG (ref 26–34)
MCHC RBC AUTO-ENTMCNC: 33.4 G/DL (ref 31–36)
MCV RBC AUTO: 90.3 FL (ref 80–100)
MONOCYTES # BLD: 0.4 K/UL (ref 0–1.3)
MONOCYTES NFR BLD: 9.2 %
NEUTROPHILS # BLD: 2 K/UL (ref 1.7–7.7)
NEUTROPHILS NFR BLD: 45.2 %
PLATELET # BLD AUTO: 283 K/UL (ref 135–450)
PMV BLD AUTO: 8.4 FL (ref 5–10.5)
POTASSIUM SERPL-SCNC: 4 MMOL/L (ref 3.5–5.1)
RBC # BLD AUTO: 3.91 M/UL (ref 4–5.2)
SODIUM SERPL-SCNC: 139 MMOL/L (ref 136–145)
TRIGL SERPL-MCNC: 51 MG/DL (ref 0–150)
VLDLC SERPL CALC-MCNC: 10 MG/DL
WBC # BLD AUTO: 4.5 K/UL (ref 4–11)

## 2025-01-12 DIAGNOSIS — Z23 NEED FOR HEPATITIS B VACCINATION: ICD-10-CM

## 2025-01-12 DIAGNOSIS — D64.9 NORMOCYTIC ANEMIA: Primary | ICD-10-CM

## (undated) DEVICE — PACK EXTREMITY

## (undated) DEVICE — DYONICS 4.0 MM ELITE                                    ACROMIOBLASTER STRAIGHT DISPOSABLE                                    BURRS, SAGE GREEN, 10000 MAXIMUM                                    RPM, PACKAGED 6 PER BOX, STERILE

## (undated) DEVICE — DRAPE,U/ SHT,SPLIT,PLAS,STERIL: Brand: MEDLINE

## (undated) DEVICE — PACK PROCEDURE SURG SHLDR MFFOP CUST

## (undated) DEVICE — CONTAINER,SPECIMEN,OR STERILE,4OZ: Brand: MEDLINE

## (undated) DEVICE — SUTURE TIGERTAPE TIGERWIRE SZ 2-0 L30IN NONABSORBABLE AR72377T

## (undated) DEVICE — SUTURE SUTTAPE FIBERLINK 1.3MM WHT BLU CLS LOOP AR7535

## (undated) DEVICE — GOWN,SIRUS,NON REINFRCD,LARGE,SET IN SL: Brand: MEDLINE

## (undated) DEVICE — SPLINT WRST FA R 7

## (undated) DEVICE — 3M™ STERI-STRIP™ REINFORCED ADHESIVE SKIN CLOSURES, R1547, 1/2 IN X 4 IN (12 MM X 100 MM), 6 STRIPS/ENVELOPE: Brand: 3M™ STERI-STRIP™

## (undated) DEVICE — GOWN SIRUS NONREIN XL W/TWL: Brand: MEDLINE INDUSTRIES, INC.

## (undated) DEVICE — SHEET,DRAPE,53X77,STERILE: Brand: MEDLINE

## (undated) DEVICE — 3M™ STERI-DRAPE™ INCISE DRAPE 1050 (60CM X 45CM): Brand: STERI-DRAPE™

## (undated) DEVICE — APPLICATOR MEDICATED 26 CC SOLUTION HI LT ORNG CHLORAPREP

## (undated) DEVICE — GAUZE,SPONGE,4"X4",8PLY,STRL,LF,10/TRAY: Brand: MEDLINE

## (undated) DEVICE — NEEDLE HYPO 25GA L0.625IN BLU POLYPR HUB S STL REG BVL STR

## (undated) DEVICE — DYONICS 25 DAY TUBE SET MUST BE                                    USED WITH 7211008, 3 PER BOX

## (undated) DEVICE — SUTURE VICRYL SZ 0 L36IN ABSRB UD CT-1 L36MM 1/2 CIR TAPR PNT VCP946H

## (undated) DEVICE — WEREWOLF FLOW 90 COBLATION WAND: Brand: COBLATION

## (undated) DEVICE — BLANKET WRM W40.2XL55.9IN IORT LO BODY + MISTRAL AIR

## (undated) DEVICE — GLOVE ORANGE PI 7 1/2   MSG9075

## (undated) DEVICE — PASSER SUT 25DEG L CRV NIT WIRE LOOP 2 FIBERSTICK MFIL SUT

## (undated) DEVICE — BANDAGE GZ W2XL75IN ST RAYON POLY CNFRM STRTCH LTWT

## (undated) DEVICE — MASC TURNOVER KIT: Brand: MEDLINE INDUSTRIES, INC.

## (undated) DEVICE — PAD,ABDOMINAL,8"X10",ST,LF: Brand: MEDLINE

## (undated) DEVICE — DYONICS 25 PATIENT TUBE SET MUST                                    BE USED WITH 7211007, 12 PER BOX

## (undated) DEVICE — 3M™ STERI-DRAPE™ U-DRAPE 1067 1067 5/BX 4BX/CS/CTN&#X20;: Brand: STERI-DRAPE™

## (undated) DEVICE — 3M™ TEGADERM™ TRANSPARENT FILM DRESSING FRAME STYLE, 1626W, 4 IN X 4-3/4 IN (10 CM X 12 CM), 50/CT 4CT/CASE: Brand: 3M™ TEGADERM™

## (undated) DEVICE — CANNULA ARTHSCP L7CM ID8.25MM TRNSLUC THRD FLX W/ NO SQUIRT

## (undated) DEVICE — DRESSING XEROFORM 3X3IN 12PLY

## (undated) DEVICE — SYRINGE 10ML HYPO W/O NDL W/ LUER SLP TP

## (undated) DEVICE — BOWL MED L 32OZ PLAS W/ MOLD GRAD EZ OPN PEEL PCH

## (undated) DEVICE — GLOVE ORANGE PI 8   MSG9080

## (undated) DEVICE — Device

## (undated) DEVICE — SOLUTION IRRIG 3000ML 0.9% SOD CHL USP UROMATIC PLAS CONT

## (undated) DEVICE — BANDAGE,GAUZE,4.5"X4.1YD,STERILE,LF: Brand: MEDLINE

## (undated) DEVICE — GLOVE ORANGE PI 8 1/2   MSG9085

## (undated) DEVICE — STOCKINETTE,IMPERVIOUS,12X48,STERILE: Brand: MEDLINE

## (undated) DEVICE — MASIMOSET LNCS DCI-P SPO2 PEDIATRIC/SLENDER DIGIT REUSABLE FINGER CLIP SENSOR: Brand: MASIMOSET LNCS® DCI-P SPO2 PEDIATRIC/SLENDER DIGIT REUSABLE FINGER CLIP SENSOR

## (undated) DEVICE — SUTURE PROL SZ 3-0 L18IN NONABSORBABLE BLU L24MM FS-1 3/8 8684G

## (undated) DEVICE — GLOVE ORTHO 7 1/2   MSG9475

## (undated) DEVICE — SUTURE PROL SZ 0 L30IN NONABSORBABLE BLU L36MM CT-1 1/2 CIR 8424H

## (undated) DEVICE — SHOULDER CANNULA SET WITHOUT FENESTRATIONS, 5.5 MM (I.D.) X 70 MM: Brand: CONMED

## (undated) DEVICE — SHOE POSTOP M MAN 9-11 UNIV FOAM TRICOT SEMI FLX SKID